# Patient Record
Sex: FEMALE | Race: WHITE | NOT HISPANIC OR LATINO | Employment: FULL TIME | URBAN - METROPOLITAN AREA
[De-identification: names, ages, dates, MRNs, and addresses within clinical notes are randomized per-mention and may not be internally consistent; named-entity substitution may affect disease eponyms.]

---

## 2017-04-13 ENCOUNTER — OFFICE VISIT (OUTPATIENT)
Dept: OBSTETRICS AND GYNECOLOGY | Facility: CLINIC | Age: 37
End: 2017-04-13

## 2017-04-13 VITALS
WEIGHT: 222.8 LBS | DIASTOLIC BLOOD PRESSURE: 94 MMHG | HEIGHT: 63 IN | HEART RATE: 86 BPM | BODY MASS INDEX: 39.48 KG/M2 | SYSTOLIC BLOOD PRESSURE: 138 MMHG

## 2017-04-13 DIAGNOSIS — N83.202 LEFT OVARIAN CYST: ICD-10-CM

## 2017-04-13 DIAGNOSIS — R74.8 ELEVATED LIVER ENZYMES: ICD-10-CM

## 2017-04-13 DIAGNOSIS — R10.32 LLQ ABDOMINAL PAIN: Primary | ICD-10-CM

## 2017-04-13 DIAGNOSIS — K92.1 MELENA: ICD-10-CM

## 2017-04-13 DIAGNOSIS — N92.1 MENOMETRORRHAGIA: ICD-10-CM

## 2017-04-13 DIAGNOSIS — F10.10 ALCOHOL ABUSE: ICD-10-CM

## 2017-04-13 DIAGNOSIS — N85.2 ENLARGED UTERUS: ICD-10-CM

## 2017-04-13 DIAGNOSIS — Z12.4 SCREENING FOR CERVICAL CANCER: ICD-10-CM

## 2017-04-13 DIAGNOSIS — Z72.0 TOBACCO USE: ICD-10-CM

## 2017-04-13 LAB
ALBUMIN SERPL-MCNC: 4.4 G/DL (ref 3.5–5.2)
ALBUMIN/GLOB SERPL: 1.8 G/DL
ALP SERPL-CCNC: 65 U/L (ref 39–117)
ALT SERPL-CCNC: 13 U/L (ref 1–33)
AST SERPL-CCNC: 13 U/L (ref 1–32)
BILIRUB SERPL-MCNC: 0.7 MG/DL (ref 0.1–1.2)
BUN SERPL-MCNC: 5 MG/DL (ref 6–20)
BUN/CREAT SERPL: 8.1 (ref 7–25)
CALCIUM SERPL-MCNC: 9.3 MG/DL (ref 8.6–10.5)
CHLORIDE SERPL-SCNC: 101 MMOL/L (ref 98–107)
CO2 SERPL-SCNC: 25 MMOL/L (ref 22–29)
CREAT SERPL-MCNC: 0.62 MG/DL (ref 0.57–1)
ERYTHROCYTE [DISTWIDTH] IN BLOOD BY AUTOMATED COUNT: 12.7 % (ref 11.7–13)
GLOBULIN SER CALC-MCNC: 2.4 GM/DL
GLUCOSE SERPL-MCNC: 98 MG/DL (ref 65–99)
HCT VFR BLD AUTO: 44.5 % (ref 35.6–45.5)
HGB BLD-MCNC: 14.9 G/DL (ref 11.9–15.5)
MCH RBC QN AUTO: 33.3 PG (ref 26.9–32)
MCHC RBC AUTO-ENTMCNC: 33.5 G/DL (ref 32.4–36.3)
MCV RBC AUTO: 99.6 FL (ref 80.5–98.2)
PLATELET # BLD AUTO: 363 10*3/MM3 (ref 140–500)
POTASSIUM SERPL-SCNC: 4 MMOL/L (ref 3.5–5.2)
PROT SERPL-MCNC: 6.8 G/DL (ref 6–8.5)
RBC # BLD AUTO: 4.47 10*6/MM3 (ref 3.9–5.2)
SODIUM SERPL-SCNC: 140 MMOL/L (ref 136–145)
TSH SERPL DL<=0.005 MIU/L-ACNC: 1.46 MIU/ML (ref 0.27–4.2)
WBC # BLD AUTO: 8.9 10*3/MM3 (ref 4.5–10.7)

## 2017-04-13 PROCEDURE — 99406 BEHAV CHNG SMOKING 3-10 MIN: CPT | Performed by: OBSTETRICS & GYNECOLOGY

## 2017-04-13 PROCEDURE — 99203 OFFICE O/P NEW LOW 30 MIN: CPT | Performed by: OBSTETRICS & GYNECOLOGY

## 2017-04-13 NOTE — PROGRESS NOTES
Ciara Chanel is a 36 y.o. female   CC: LLQ pain    History of Present Illness  Patient presents for the evaluation of left lower quadrant pain.  Pain is been ongoing for several months.  It is a colicky, crampy type pain in the left lower quadrant.  Patient went to the emergency room about 2 weeks ago because of the pain.  CT scan was performed which showed about a 4 x 4 cm left ovary which seemed enlarged from her previous CT scan about 15 months prior.  Otherwise a CT was unremarkable.  Only other significance were labs which showed mild elevation of the liver function tests.  Patient does report some irregular menses..  They last 1-2 days, or longer.  They happen irregularly.  Occasionally has breakthrough bleeding.  She does report pretty significant cramping with her periods, even if they'll only last 1-2 days.  Denies hot flashes or night sweats.  She is sexually active with 1 partner.  The patient reports that she had some type of procedure done after her last child was born 17 months ago for contraception.  She reports that she had a ring placed inside her abdomen which was supposed to be removable, but could be permanent if she wanted to be for contraception.  It sounds like that she had Falope-Rings placed.  The patient does also report frequent diarrhea.  She has loose bowel movements about every day.  She does report symptoms of bloating and early satiety.  She also notes dark, tarry stools at times.  She has never had this evaluated.      Past Medical History:   Diagnosis Date   • Abnormal Pap smear of cervix      Past Surgical History:   Procedure Laterality Date   •  SECTION     • CHOLECYSTECTOMY     • TONSILLECTOMY     • TUBAL ABDOMINAL LIGATION      Likely Fallope rings     Family History   Problem Relation Age of Onset   • Uterine cancer Mother    • No Known Problems Brother    • No Known Problems Daughter    • No Known Problems Brother    • No Known Problems Daughter   "    Social History   Substance Use Topics   • Smoking status: Current Every Day Smoker     Types: Cigarettes   • Smokeless tobacco: Never Used   • Alcohol use Yes     Meds:  None    No Known Allergies    Review of Systems  General: No fever or chills  Constitutional: No weight loss or gain, no hair loss  HENT: No headache, no hearing loss, no tinnitus  Eyes: normal vision, no eye pain  Lungs: No cough, no shortness of breath  Heart: No chest pain, no palpitations  Abdomen: No nausea, vomiting, constipation; Pos daily diarrhea  : No dysuria, no hematuria  Skin: No rashes  Lymph: No swelling  Neuro: No parathesia, no weakness  Psych: Normal though content, no hallucinations, no SI/HI    Objective   Physical Exam  Vitals:    04/13/17 1305   BP: 138/94   Pulse: 86   Weight: 222 lb 12.8 oz (101 kg)   Height: 63\" (160 cm)   Patient's last menstrual period was 04/04/2017.   Gen: No acute distress, awake and oriented times three  Abdomen: soft, nontender, non distended, normoactive bowel sounds  Pelvic:   Normal external female genitalia, no lesions  Vagina: No blood or discharge  Cervix: No cervical motion tenderness, no lesions, no active bleeding, nonfriable  Uterus: Anteverted, About 12 weeks size, irregular contour, mildly tender  Adnexa: No masses or tenderness  Rectal: Deferred  Psych: Good judgement and insight, normal affect and mood      Assessment/Plan    was seen today for abdominal pain.    Diagnoses and all orders for this visit:    LLQ abdominal pain    Left ovarian cyst    Screening for cervical cancer  -     IGP, Apt HPV,rfx 16 / 18,45    Melena    Tobacco use    Menometrorrhagia  -     CBC (No Diff)  -     TSH Rfx On Abnormal To Free T4    Enlarged uterus    Alcohol abuse  -     Comprehensive Metabolic Panel  -     Hepatitis Panel, Acute    Elevated liver enzymes  -     Comprehensive Metabolic Panel  -     Hepatitis Panel, Acute    Patient with a possible small ovarian cyst seen on CT scan about 2 " weeks ago.  I do not actually suspect this is the likely cause of her symptoms.  Her uterus feels mildly enlarged today, likely consistent with a fibroid uterus.  This may be causing some of her pain.  She also reports heavy and painful periods, but that only last 1-2 days. We will get an ultrasound of the pelvis in about 3-4 weeks for evaluation of the cyst to see if it has resolved.  Can also check for enlarged uterus.  I will see her back after that ultrasound.  Patient is due for a Pap smear, so we performed Pap smear today.  I have encouraged the patient to follow-up with her primary care physician for further evaluation of her chronic diarrhea, in GI symptoms.  Patient also had elevated liver function enzymes while in the emergency room.  She does admit to Dr. Duran alcohol every day.  This may be related to her alcohol use.  We will repeat her labs today as well as a hepatitis panel.  She denies any history of IV drug abuse.  Ultimately, she will need to follow up with this with her primary care physician.  She verbalized understanding.  I've independently reviewed the patient's outside emergency room records.  Return to the office in 3-4 weeks.  I spent 20 out of 30 minutes with the patient in face to face counseling of the above issues.  The patient is a smoker.  She's counseled about the risks of tobacco use and the importance of tobacco cessation.  Greater than 3 minutes is spent in tobacco cessation counseling.

## 2017-04-14 LAB
HAV IGM SERPL QL IA: NEGATIVE
HBV CORE IGM SERPL QL IA: NEGATIVE
HBV SURFACE AG SERPL QL IA: NEGATIVE
HCV AB S/CO SERPL IA: <0.1 S/CO RATIO (ref 0–0.9)

## 2017-04-19 LAB
CYTOLOGIST CVX/VAG CYTO: NORMAL
CYTOLOGY CVX/VAG DOC THIN PREP: NORMAL
DX ICD CODE: NORMAL
HIV 1 & 2 AB SER-IMP: NORMAL
HPV I/H RISK 4 DNA CVX QL PROBE+SIG AMP: NEGATIVE
OTHER STN SPEC: NORMAL
PATH REPORT.FINAL DX SPEC: NORMAL
STAT OF ADQ CVX/VAG CYTO-IMP: NORMAL

## 2017-04-20 ENCOUNTER — TELEPHONE (OUTPATIENT)
Dept: OBSTETRICS AND GYNECOLOGY | Facility: CLINIC | Age: 37
End: 2017-04-20

## 2017-04-20 NOTE — TELEPHONE ENCOUNTER
Notify the patient that her hepatitis tests were negative and her liver function tests were normal.  Her blood count is normal.  Her thyroid test was normal.

## 2017-04-20 NOTE — TELEPHONE ENCOUNTER
----- Message from Rayo Garcia MD sent at 4/19/2017  5:15 PM EDT -----  Notify the patient that her Pap smear was normal

## 2017-05-18 ENCOUNTER — OFFICE VISIT (OUTPATIENT)
Dept: OBSTETRICS AND GYNECOLOGY | Facility: CLINIC | Age: 37
End: 2017-05-18

## 2017-05-18 ENCOUNTER — PROCEDURE VISIT (OUTPATIENT)
Dept: OBSTETRICS AND GYNECOLOGY | Facility: CLINIC | Age: 37
End: 2017-05-18

## 2017-05-18 VITALS
WEIGHT: 224 LBS | DIASTOLIC BLOOD PRESSURE: 91 MMHG | HEIGHT: 63 IN | SYSTOLIC BLOOD PRESSURE: 139 MMHG | HEART RATE: 74 BPM | BODY MASS INDEX: 39.69 KG/M2

## 2017-05-18 DIAGNOSIS — N83.202 LEFT OVARIAN CYST: ICD-10-CM

## 2017-05-18 DIAGNOSIS — N92.1 MENOMETRORRHAGIA: ICD-10-CM

## 2017-05-18 DIAGNOSIS — N39.46 MIXED INCONTINENCE: ICD-10-CM

## 2017-05-18 DIAGNOSIS — D25.9 UTERINE LEIOMYOMA, UNSPECIFIED LOCATION: Primary | ICD-10-CM

## 2017-05-18 DIAGNOSIS — K92.1 MELENA: ICD-10-CM

## 2017-05-18 DIAGNOSIS — N83.202 LEFT OVARIAN CYST: Primary | ICD-10-CM

## 2017-05-18 DIAGNOSIS — D25.2 SUBSEROUS LEIOMYOMA OF UTERUS: ICD-10-CM

## 2017-05-18 PROBLEM — Z12.4 SCREENING FOR CERVICAL CANCER: Status: RESOLVED | Noted: 2017-04-13 | Resolved: 2017-05-18

## 2017-05-18 PROBLEM — R74.8 ELEVATED LIVER ENZYMES: Status: RESOLVED | Noted: 2017-04-13 | Resolved: 2017-05-18

## 2017-05-18 PROCEDURE — 76830 TRANSVAGINAL US NON-OB: CPT | Performed by: OBSTETRICS & GYNECOLOGY

## 2017-05-18 PROCEDURE — 99214 OFFICE O/P EST MOD 30 MIN: CPT | Performed by: OBSTETRICS & GYNECOLOGY

## 2017-05-18 RX ORDER — OXYBUTYNIN CHLORIDE 5 MG/1
5 TABLET ORAL 2 TIMES DAILY
Qty: 60 TABLET | Refills: 3 | Status: SHIPPED | OUTPATIENT
Start: 2017-05-18 | End: 2017-07-27

## 2017-07-27 ENCOUNTER — OFFICE VISIT (OUTPATIENT)
Dept: OBSTETRICS AND GYNECOLOGY | Facility: CLINIC | Age: 37
End: 2017-07-27

## 2017-07-27 VITALS
HEIGHT: 63 IN | WEIGHT: 230 LBS | DIASTOLIC BLOOD PRESSURE: 82 MMHG | HEART RATE: 83 BPM | SYSTOLIC BLOOD PRESSURE: 124 MMHG | BODY MASS INDEX: 40.75 KG/M2

## 2017-07-27 DIAGNOSIS — R10.30 LOWER ABDOMINAL PAIN: Primary | ICD-10-CM

## 2017-07-27 DIAGNOSIS — N91.5 OLIGOMENORRHEA: ICD-10-CM

## 2017-07-27 DIAGNOSIS — N39.46 MIXED INCONTINENCE: ICD-10-CM

## 2017-07-27 DIAGNOSIS — N83.202 LEFT OVARIAN CYST: ICD-10-CM

## 2017-07-27 PROBLEM — N92.1 MENOMETRORRHAGIA: Status: RESOLVED | Noted: 2017-04-13 | Resolved: 2017-07-27

## 2017-07-27 PROBLEM — R10.32 LLQ ABDOMINAL PAIN: Status: RESOLVED | Noted: 2017-04-13 | Resolved: 2017-07-27

## 2017-07-27 LAB
ALBUMIN SERPL-MCNC: 4 G/DL (ref 3.5–5.2)
ALBUMIN/GLOB SERPL: 1.5 G/DL
ALP SERPL-CCNC: 61 U/L (ref 39–117)
ALT SERPL-CCNC: 15 U/L (ref 1–33)
AST SERPL-CCNC: 17 U/L (ref 1–32)
BILIRUB BLD-MCNC: NEGATIVE MG/DL
BILIRUB SERPL-MCNC: 0.5 MG/DL (ref 0.1–1.2)
BUN SERPL-MCNC: 8 MG/DL (ref 6–20)
BUN/CREAT SERPL: 13.8 (ref 7–25)
CALCIUM SERPL-MCNC: 9.3 MG/DL (ref 8.6–10.5)
CHLORIDE SERPL-SCNC: 102 MMOL/L (ref 98–107)
CLARITY, POC: CLEAR
CO2 SERPL-SCNC: 22 MMOL/L (ref 22–29)
COLOR UR: YELLOW
CREAT SERPL-MCNC: 0.58 MG/DL (ref 0.57–1)
GLOBULIN SER CALC-MCNC: 2.7 GM/DL
GLUCOSE SERPL-MCNC: 94 MG/DL (ref 65–99)
GLUCOSE UR STRIP-MCNC: NEGATIVE MG/DL
HBA1C MFR BLD: 4.7 % (ref 4.8–5.6)
KETONES UR QL: NEGATIVE
LEUKOCYTE EST, POC: NEGATIVE
NITRITE UR-MCNC: NEGATIVE MG/ML
PH UR: 5.5 [PH] (ref 5–8)
POTASSIUM SERPL-SCNC: 4.3 MMOL/L (ref 3.5–5.2)
PROT SERPL-MCNC: 6.7 G/DL (ref 6–8.5)
PROT UR STRIP-MCNC: NEGATIVE MG/DL
RBC # UR STRIP: ABNORMAL /UL
SODIUM SERPL-SCNC: 138 MMOL/L (ref 136–145)
SP GR UR: 1.03 (ref 1–1.03)
UROBILINOGEN UR QL: NORMAL

## 2017-07-27 PROCEDURE — 99214 OFFICE O/P EST MOD 30 MIN: CPT | Performed by: OBSTETRICS & GYNECOLOGY

## 2017-07-27 RX ORDER — TOLTERODINE TARTRATE 2 MG/1
2 TABLET, EXTENDED RELEASE ORAL 2 TIMES DAILY
Qty: 60 TABLET | Refills: 0 | Status: SHIPPED | OUTPATIENT
Start: 2017-07-27 | End: 2017-08-22 | Stop reason: SDUPTHER

## 2017-07-27 NOTE — PROGRESS NOTES
Subjective   Melissa Chanel is a 36 y.o. female   CC: Lower abdominal pain, leakage of urine, irregular menses  History of Present Illness  Patient is here for the evaluation of several issues today.  Her biggest concern is with her lower abdominal pain.  She reports that this is still ongoing for the time of her last visit.  She reports that she feels the pains about 2-3 times per week.  They're located in the central lower abdomen.  Occasionally they will awaken her from sleep.  She reports they're severe when she has them.  Patient is also having persistent problems with leakage of urine.  She has mostly what seems to be urge symptoms.  She has leakage of urine every day.  She has started to her incontinence pads.  She reports that she gets embarrassed over her symptoms.  She tried oxybutynin for about 3 months with no significant relief of her symptoms, but with side effects of dry mouth.  She has tried changing her fluid intake.  She has limited soft drinks and caffeine.  She has not been able to lose any weight since her last visit, in fact she has gained about 6 pounds.  The patient is also not had a period since her last visit.  The patient reports that she is very concerned about the causes of her pain.  She feels that while the pain and the leakage of urine or both stressful, the pain is more concerning to her.  She would like to avoid surgery if possible.      The following portions of the patient's history were reviewed and updated as appropriate: allergies, current medications, past family history, past medical history, past social history, past surgical history and problem list.    Review of Systems  General: No fever or chills  Constitutional: No weight loss or gain, no hair loss  HENT: No headache, no hearing loss, no tinnitus  Abdomen: No nausea, vomiting, constipation or diarrhea  : No dysuria, no hematuria  Skin: No rashes  Lymph: No swelling  Neuro: No parathesia, no weakness  Psych: Normal  "though content, no hallucinations, no SI/HI    Objective   Physical Exam  Vitals:    07/27/17 0951   BP: 124/82   Pulse: 83   Weight: 230 lb (104 kg)   Height: 63\" (160 cm)   Gen: No acute distress, awake and oriented times three  Abdomen: soft, nontender, non distended, normoactive bowel sounds  Pelvic:   Normal external female genitalia, no lesions  Vagina: No blood or discharge  Cervix: No cervical motion tenderness, no lesions, no active bleeding, nonfriable  Uterus: Anteverted, about 12 weeks size, mildly tender  Adnexa: No masses or tenderness  Rectal: Deferred  Psych: Good judgement and insight, normal affect and mood      Assessment/Plan   Diagnoses and all orders for this visit:    Lower abdominal pain  -     Comprehensive Metabolic Panel  -     diclofenac (VOLTAREN) 50 MG EC tablet; Take 1 tablet by mouth 4 (Four) Times a Day As Needed (pain). Take four times daily for 5 consecutive days during your period  -     POC Urinalysis Dipstick    Oligomenorrhea  -     Testosterone Free Direct  -     Testosterone  -     Follicle Stimulating Hormone  -     Estradiol  -     Prolactin  -     Hemoglobin A1c  -     Comprehensive Metabolic Panel    Left ovarian cyst    Mixed incontinence  -     Urine Culture  -     tolterodine (DETROL) 2 MG tablet; Take 1 tablet by mouth 2 (Two) Times a Day.  -     Ambulatory Referral to Gynecologic Urology    Unclear etiology of her abdominal pain at this time.  Patient does have an enlarged uterus which is somewhat tender on exam.  This may be secondary to uterine fibroid or adenomyosis.  I would suspect, however, that the patient had adenomyosis she would be having more bleeding issues.  We'll obtain an ultrasound to reevaluate for the previously seen small simple ovarian cyst as well as the size and shape of the uterus.  Start diclofenac to help with her abdominal pain.  We will obtain lab work to look into the patient's  Set oligomenorrhea/amenorrhea.  Regarding the patient's " urinary incontinence, it sounds more consistent with either an urge or mixed incontinence.  The patient got no relief with oxybutynin.  We will try Detrol.  I will also refer the patient to urogynecology for further evaluation.  I have some concerns of the patient's pains may be secondary to uterine fibroid.  We have we discussed today the possibility of a hysterectomy.  Additionally, hysterectomy were necessary, she may consider concomitant surgery for leakage of urine, if indicated.  This may be a combined case between our office and urogynecology.  Return to the office in 2-4 weeks.  Repeat pelvic ultrasound at that time.  GYN visit follow.  She should also try to make an appointment with urogynecology.  I've encouraged patient to work on weight loss.  Continue dietary changes and increase exercise.

## 2017-07-28 LAB
ESTRADIOL SERPL-MCNC: 90.4 PG/ML
FSH SERPL-ACNC: 8 MIU/ML
PROLACTIN SERPL-MCNC: 22.3 NG/ML (ref 4.8–23.3)
TESTOST FREE SERPL-MCNC: 3.8 PG/ML (ref 0–4.2)
TESTOST SERPL-MCNC: 41 NG/DL (ref 8–48)

## 2017-07-29 LAB
BACTERIA UR CULT: NORMAL
BACTERIA UR CULT: NORMAL

## 2017-08-22 ENCOUNTER — OFFICE VISIT (OUTPATIENT)
Dept: OBSTETRICS AND GYNECOLOGY | Facility: CLINIC | Age: 37
End: 2017-08-22

## 2017-08-22 ENCOUNTER — PROCEDURE VISIT (OUTPATIENT)
Dept: OBSTETRICS AND GYNECOLOGY | Facility: CLINIC | Age: 37
End: 2017-08-22

## 2017-08-22 VITALS
DIASTOLIC BLOOD PRESSURE: 80 MMHG | SYSTOLIC BLOOD PRESSURE: 120 MMHG | HEIGHT: 63 IN | HEART RATE: 73 BPM | BODY MASS INDEX: 41.11 KG/M2 | WEIGHT: 232 LBS

## 2017-08-22 DIAGNOSIS — N83.201 RIGHT OVARIAN CYST: ICD-10-CM

## 2017-08-22 DIAGNOSIS — R10.30 LOWER ABDOMINAL PAIN: Primary | ICD-10-CM

## 2017-08-22 DIAGNOSIS — N83.201 CYST OF RIGHT OVARY: ICD-10-CM

## 2017-08-22 DIAGNOSIS — N94.6 DYSMENORRHEA: ICD-10-CM

## 2017-08-22 DIAGNOSIS — R10.2 PELVIC PAIN: Primary | ICD-10-CM

## 2017-08-22 DIAGNOSIS — N39.46 MIXED INCONTINENCE: ICD-10-CM

## 2017-08-22 PROBLEM — N83.202 LEFT OVARIAN CYST: Status: RESOLVED | Noted: 2017-04-13 | Resolved: 2017-08-22

## 2017-08-22 PROBLEM — N85.2 ENLARGED UTERUS: Status: RESOLVED | Noted: 2017-04-13 | Resolved: 2017-08-22

## 2017-08-22 PROCEDURE — 76830 TRANSVAGINAL US NON-OB: CPT | Performed by: OBSTETRICS & GYNECOLOGY

## 2017-08-22 PROCEDURE — 99214 OFFICE O/P EST MOD 30 MIN: CPT | Performed by: OBSTETRICS & GYNECOLOGY

## 2017-08-22 RX ORDER — DICYCLOMINE HCL 20 MG
20 TABLET ORAL 4 TIMES DAILY PRN
Qty: 60 TABLET | Refills: 1 | Status: SHIPPED | OUTPATIENT
Start: 2017-08-22 | End: 2019-02-14

## 2017-08-22 RX ORDER — TOLTERODINE TARTRATE 2 MG/1
2 TABLET, EXTENDED RELEASE ORAL 2 TIMES DAILY
Qty: 60 TABLET | Refills: 0 | Status: SHIPPED | OUTPATIENT
Start: 2017-08-22 | End: 2019-02-14

## 2017-08-22 NOTE — PROGRESS NOTES
"Subjective   Melissa Chanel is a 36 y.o. female   CC: Pt here for fu US.  History of Present Illness  Pt here for fu US.  Ultrasound was performed to evaluate for abdominal pain.  Patient still reports central eyes lower abdominal pain.  This is present regardless of her menses.  She notices about 3-4 times per week.  She also does notices abdominal bloating.  She does also have problems with very painful periods.  Her bleeding last 1-2 days but is very painful.  She is on her period today.  Patient is also still having problems with leakage of urine.  She has been seen urogynecology, Dr. Holder, and they're planning to urodynamics in the near future.  She reports a little relief with the Detrol.  Patient is trying to cut down on smoking, which she is still smoking.  She is also making dietary and lifestyle changes to help with losing weight.      The following portions of the patient's history were reviewed and updated as appropriate: allergies, current medications, past family history, past medical history, past social history, past surgical history and problem list.    Review of Systems  General: No fever or chills  : No dysuria, no hematuria  Psych: Normal though content, no hallucinations, no SI/HI    Objective   Physical Exam  Vitals:    08/22/17 1356   BP: 120/80   Pulse: 73   Weight: 232 lb (105 kg)   Height: 63\" (160 cm)   Gen: No acute distress, awake and oriented times three  Pelvic: Deferred  Psych: Good judgement and insight, normal affect and mood    Ultrasound today: Uterus is 8 cm in maximum diameter.  There is a 1.3 cm fundal fibroid the stable appearance from the last ultrasound.  Left ovary is normal-appearing with interval resolution of the previously seen cyst.  Right ovary is now enlarged with a 5 cm simple appearing cyst with a septation.  There is no pelvic free fluid.      Assessment/Plan   Diagnoses and all orders for this visit:    Lower abdominal pain  -     diclofenac (VOLTAREN) 50 " MG EC tablet; Take 1 tablet by mouth 4 (Four) Times a Day As Needed (pain). Take four times daily for 5 consecutive days during your period  -     dicyclomine (BENTYL) 20 MG tablet; Take 1 tablet by mouth 4 (Four) Times a Day As Needed (abdominal pain).    Dysmenorrhea    Mixed incontinence  -     tolterodine (DETROL) 2 MG tablet; Take 1 tablet by mouth 2 (Two) Times a Day.    Right ovarian cyst      The patient has had an oval resolution of her left ovarian cyst with interval development of a new cyst on the right ovary.  Overall, I doubt that this is the cause of her pain.  Her pain seems to persist throughout the month regardless of her menstrual cycle.  Additionally, the previously seen left ovarian cyst has resolved, and this is a new cyst on the right side.  Most of her pain is in the midline.  She does also note some bloating.  I more concerned patient may have other causes of her pain such as musculoskeletal strain, gastroenterologic source, etc.  Patient does have painful menses, but this only lasts for 1-2 days.  She has not really been taking diclofenac much.  I've encouraged the patient to try to take diclofenac for the days of her menstrual cycle.  We'll also start the patient on Bentyl for one she is not on her period to see if this helps with her abdominal discomfort.  I've encouraged the patient to see her primary care physician for possible evaluation with gastroenterology to look for other sources of her abdominal pain.  The patient seems to be more interested in having a hysterectomy.  My concern is that her pain may be from a non-gynecologic source, in which case a hysterectomy we unlikely to help her pain, and would take on significant risks.  The patient is obese and is a smoker; therefore, she is at somewhat increased perioperative risk of complications.  I have encouraged the patient to start the Bentyl when she is not on her period, take the diclofenac during her period, and see her primary  care physician.  I will plan to see her back in 6 weeks for repeat ultrasound to evaluate for persistence or resolution of the newly found right ovarian cyst.  She should also continue to follow up with urogynecology.  She may continue her Detrol until we have more information there.  If patient has exhausted her other options and is still having pain, we may consider hysterectomy, but I have counseled the patient that I feel need to explore other avenues of the source of her pain prior to proceeding with surgery.  She verbalizes understanding.    I spent 20 out of 25 minutes with the patient in face to face counseling of the above issues.

## 2018-07-22 ENCOUNTER — HOSPITAL ENCOUNTER (EMERGENCY)
Facility: HOSPITAL | Age: 38
Discharge: HOME | End: 2018-07-22
Attending: EMERGENCY MEDICINE | Admitting: EMERGENCY MEDICINE

## 2018-07-22 VITALS
SYSTOLIC BLOOD PRESSURE: 130 MMHG | WEIGHT: 236 LBS | HEIGHT: 63 IN | DIASTOLIC BLOOD PRESSURE: 84 MMHG | HEART RATE: 90 BPM | OXYGEN SATURATION: 100 % | BODY MASS INDEX: 41.82 KG/M2 | TEMPERATURE: 98.4 F | RESPIRATION RATE: 15 BRPM

## 2018-07-22 DIAGNOSIS — T14.8XXA SUPERFICIAL LACERATION: ICD-10-CM

## 2018-07-22 DIAGNOSIS — F10.920 ALCOHOLIC INTOXICATION WITHOUT COMPLICATION (HCC): Primary | ICD-10-CM

## 2018-07-22 LAB
AMPHET+METHAMPHET UR QL: NEGATIVE
BARBITURATES UR QL SCN: NEGATIVE
BENZODIAZ UR QL SCN: NEGATIVE
CANNABINOIDS SERPL QL: POSITIVE
COCAINE UR QL: NEGATIVE
ETHANOL BLD-MCNC: 138 MG/DL (ref 0–10)
ETHANOL UR QL: 0.14 %
METHADONE UR QL SCN: NEGATIVE
OPIATES UR QL: NEGATIVE
OXYCODONE UR QL SCN: NEGATIVE

## 2018-07-22 PROCEDURE — 80307 DRUG TEST PRSMV CHEM ANLYZR: CPT | Performed by: PHYSICIAN ASSISTANT

## 2018-07-22 PROCEDURE — 99285 EMERGENCY DEPT VISIT HI MDM: CPT

## 2018-07-22 PROCEDURE — 90791 PSYCH DIAGNOSTIC EVALUATION: CPT

## 2018-07-22 RX ORDER — IBUPROFEN 800 MG/1
800 TABLET ORAL ONCE
Status: COMPLETED | OUTPATIENT
Start: 2018-07-22 | End: 2018-07-22

## 2018-07-22 RX ADMIN — IBUPROFEN 800 MG: 800 TABLET ORAL at 06:00

## 2019-02-14 ENCOUNTER — OFFICE VISIT (OUTPATIENT)
Dept: OBSTETRICS AND GYNECOLOGY | Facility: CLINIC | Age: 39
End: 2019-02-14

## 2019-02-14 VITALS
HEIGHT: 63 IN | BODY MASS INDEX: 43.94 KG/M2 | HEART RATE: 80 BPM | DIASTOLIC BLOOD PRESSURE: 90 MMHG | SYSTOLIC BLOOD PRESSURE: 132 MMHG | WEIGHT: 248 LBS

## 2019-02-14 DIAGNOSIS — N94.6 DYSMENORRHEA: ICD-10-CM

## 2019-02-14 DIAGNOSIS — N92.0 MENORRHAGIA WITH REGULAR CYCLE: Primary | ICD-10-CM

## 2019-02-14 DIAGNOSIS — K29.50 CHRONIC GASTRITIS WITHOUT BLEEDING, UNSPECIFIED GASTRITIS TYPE: ICD-10-CM

## 2019-02-14 DIAGNOSIS — N39.46 MIXED INCONTINENCE: ICD-10-CM

## 2019-02-14 DIAGNOSIS — Z71.6 ENCOUNTER FOR TOBACCO USE CESSATION COUNSELING: ICD-10-CM

## 2019-02-14 DIAGNOSIS — L03.311 ABDOMINAL WALL CELLULITIS: ICD-10-CM

## 2019-02-14 DIAGNOSIS — R10.30 LOWER ABDOMINAL PAIN: ICD-10-CM

## 2019-02-14 PROBLEM — N91.5 OLIGOMENORRHEA: Status: RESOLVED | Noted: 2017-07-27 | Resolved: 2019-02-14

## 2019-02-14 PROBLEM — E66.01 MORBID OBESITY (HCC): Status: ACTIVE | Noted: 2019-02-14

## 2019-02-14 PROCEDURE — 99214 OFFICE O/P EST MOD 30 MIN: CPT | Performed by: OBSTETRICS & GYNECOLOGY

## 2019-02-14 PROCEDURE — 99406 BEHAV CHNG SMOKING 3-10 MIN: CPT | Performed by: OBSTETRICS & GYNECOLOGY

## 2019-02-14 RX ORDER — PANTOPRAZOLE SODIUM 40 MG/1
40 TABLET, DELAYED RELEASE ORAL DAILY
Qty: 30 TABLET | Refills: 5 | Status: SHIPPED | OUTPATIENT
Start: 2019-02-14 | End: 2019-08-19 | Stop reason: SDUPTHER

## 2019-02-14 RX ORDER — CEPHALEXIN 500 MG/1
500 CAPSULE ORAL 2 TIMES DAILY
Qty: 20 CAPSULE | Refills: 0 | Status: SHIPPED | OUTPATIENT
Start: 2019-02-14 | End: 2019-02-24

## 2019-02-14 RX ORDER — IBUPROFEN 400 MG/1
400 TABLET ORAL EVERY 6 HOURS PRN
COMMUNITY
End: 2019-12-18 | Stop reason: HOSPADM

## 2019-02-14 NOTE — PROGRESS NOTES
Subjective   Melissa Chanel is a 38 y.o. female.   CC: pt here for painful cycles and lots of clotting.   History of Present Illness   Patient here with complaints of abdominal pain and heavy and painful periods.  She says that she has pain basically every day of the month.  She says that her pain intensifies around the time of her periods.  Patient reports that she wears a heating pad on her abdomen so frequently that she actually gets burns and lesions on her abdomen.  Patient also reports that she takes about 6-8 pills worth of ibuprofen every day.  She says that her periods are unpredictable, but when she has them they last about 1-2 days, but they are very heavy and painful during that time.  Patient has previously seen me, but not for about the last 18 months.  She has had a history of ovarian cysts in the past which have come and gone.  Last ultrasound was in August 2017.  She was also referred to urogynecology at that time, and she had at least one appointment with them, but she was lost to follow-up.  Patient also reports significant GI symptoms.  She reports frequent episodes of diarrhea that has been long-standing.  She reports abdominal bloating and discomfort.  She reports almost daily episodes of fecal urgency and occasional episodes of fecal incontinence.  Patient has had previous EGD performed by GI that showed duodenal ulcer, gastritis, esophagitis.  She also had abnormal mitochondrial IgG AB-AMA in January 2016.  Liver function tests were elevated at that time.  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  She has had cholecystectomy in the past.      Current Outpatient Medications:   •  ibuprofen (ADVIL,MOTRIN) 400 MG tablet, Take 400 mg by mouth Every 6 (Six) Hours As Needed., Disp: , Rfl:   •  pantoprazole (PROTONIX) 40 MG EC tablet, Take 1 tablet by mouth Daily., Disp: 30 tablet, Rfl: 5     No Known Allergies     The following portions of the patient's history were reviewed  "and updated as appropriate: allergies, current medications, past family history, past medical history, past social history, past surgical history and problem list.    Review of Systems  General: No fever or chills  Constitutional: Pos weight gain, no hair loss  HENT: No headache, no hearing loss, no tinnitus  Eyes: normal vision, no eye pain  Lungs: No cough, no shortness of breath  Heart: No chest pain, no palpitations  Abdomen: Pos nausea, Pos diarrhea  : No dysuria, no hematuria  Skin: No rashes  Lymph: No swelling  Neuro: No parathesia, no weakness  Psych: Normal though content, no hallucinations, no SI/HI    Objective   Physical Exam  Vitals:    02/14/19 1349   BP: 132/90   Pulse: 80   Weight: 112 kg (248 lb)   Height: 160 cm (63\")     Patient's last menstrual period was 01/31/2019 (approximate).     Gen: No acute distress, awake and oriented times three  Abdomen: soft, nontender, no masses or hernia, no hepatosplenomegaly, non distended, normoactive bowel sounds  There are 2 roughly quarter-sized erythematous, weepy lesions on her abdominal wall.  The patient states that these are burns from her heating pad.  Pelvic: Exam performed in the presence of a female chaperone  Patient has provided verbal consent to proceed with exam.  Normal external female genitalia, no lesions  Urethra: Normal meatus, no caruncle  Bladder: nontender  Vagina: No blood or discharge  Cervix: No cervical motion tenderness, no lesions, no active bleeding, nonfriable  Uterus: Anteverted, about 10 weeks size, mildly tender  Adnexa: No masses or tenderness  External anal exam: Normal appearance, no lesions or hemorrhoids  Rectal: Deferred  Psych: Good judgement and insight, normal affect and mood      Assessment/Plan   Diagnoses and all orders for this visit:    Menorrhagia with regular cycle  -     CBC (No Diff)  -     Comprehensive Metabolic Panel  -     TSH Rfx On Abnormal To Free T4  We will check labs as above.  We will also obtain " pelvic ultrasound to evaluate for problems such as fibroids or polyps that may be causing her periods to be heavy.  Lower abdominal pain  -     NuSwab VG+ - Swab, Vagina  I am not convinced that the only source of her abdominal pain is gynecologic in nature.  Patient reports that she has pain basically every day, although it does intensify during her menses.  She says her periods typically last 1-2 days/month.  Patient does endorse other symptoms suggestive of more of a GI source including frequent diarrhea, fecal urgency, fecal incontinence, abdominal bloating.  Even if there were issues related to her uterus or ovaries that could be causing abdominal pain, I would be concerned that if she did not evaluate this further with her primary care physician or gastroenterologist she would continue to have significant pain even after definitive treatment from a gynecologic standpoint.  Patient needs concomitant follow-up with PCP/gastroenterology.  We will obtain cultures today and also to help exclude infectious source of pain and heavy bleeding.  Patient also has some cellulitic appearing wounds on her abdomen, likely from which she reports as burns from a heating pad.  I have cautioned the patient about the safe use of heating pads and that she needs to decrease the temperature in the time to which she is using the pad.  I also recommended that she put cough material between the pad in her skin.  We discussed appropriate wound care.  I will treat her empirically with a course of keflex.  Dysmenorrhea  -     NuSwab VG+ - Swab, Vagina    Mixed incontinence  This has been a long-standing issue for the patient.  She was previously treated with Detrol and had seen urogynecology, but the patient was lost to follow-up.  Encounter for tobacco use cessation counseling  I advised  of the risks of continuing to use tobacco, and I provided her with tobacco cessation educational materials in the After Visit Summary.  I  explained to the patient the short-term and long-term risk of tobacco use.  I also explained that if surgical therapy were ever going to be an option for her to help with her symptoms, she would need to stop smoking, smoking would greatly increase the risk of perioperative complications.    During this visit, I spent greater than 3 minutes counseling the patient regarding tobacco cessation.  Chronic gastritis without bleeding, unspecified gastritis type  -     pantoprazole (PROTONIX) 40 MG EC tablet; Take 1 tablet by mouth Daily.    Patient has had previous procedures which have indicated duodenal ulcers, gastritis, esophagitis.  She is on basically no therapy for this.  I will start the patient on a daily PPI, but she needs to follow-up with gastroenterology.  This may be a source of her abdominal pain as well.  Patient needs to limit her NSAID use.

## 2019-02-15 LAB
ALBUMIN SERPL-MCNC: 4 G/DL (ref 3.5–5.5)
ALBUMIN/GLOB SERPL: 1.7 {RATIO} (ref 1.2–2.2)
ALP SERPL-CCNC: 67 IU/L (ref 39–117)
ALT SERPL-CCNC: 8 IU/L (ref 0–32)
AST SERPL-CCNC: 12 IU/L (ref 0–40)
BILIRUB SERPL-MCNC: 0.3 MG/DL (ref 0–1.2)
BUN SERPL-MCNC: 8 MG/DL (ref 6–20)
BUN/CREAT SERPL: 14 (ref 9–23)
CALCIUM SERPL-MCNC: 9 MG/DL (ref 8.7–10.2)
CHLORIDE SERPL-SCNC: 106 MMOL/L (ref 96–106)
CO2 SERPL-SCNC: 21 MMOL/L (ref 20–29)
CREAT SERPL-MCNC: 0.57 MG/DL (ref 0.57–1)
ERYTHROCYTE [DISTWIDTH] IN BLOOD BY AUTOMATED COUNT: 13.8 % (ref 12.3–15.4)
GLOBULIN SER CALC-MCNC: 2.4 G/DL (ref 1.5–4.5)
GLUCOSE SERPL-MCNC: 85 MG/DL (ref 65–99)
HCT VFR BLD AUTO: 41.5 % (ref 34–46.6)
HGB BLD-MCNC: 13.3 G/DL (ref 11.1–15.9)
MCH RBC QN AUTO: 32.4 PG (ref 26.6–33)
MCHC RBC AUTO-ENTMCNC: 32 G/DL (ref 31.5–35.7)
MCV RBC AUTO: 101 FL (ref 79–97)
PLATELET # BLD AUTO: 328 X10E3/UL (ref 150–379)
POTASSIUM SERPL-SCNC: 4.9 MMOL/L (ref 3.5–5.2)
PROT SERPL-MCNC: 6.4 G/DL (ref 6–8.5)
RBC # BLD AUTO: 4.11 X10E6/UL (ref 3.77–5.28)
SODIUM SERPL-SCNC: 142 MMOL/L (ref 134–144)
TSH SERPL DL<=0.005 MIU/L-ACNC: 1.8 UIU/ML (ref 0.45–4.5)
WBC # BLD AUTO: 8.4 X10E3/UL (ref 3.4–10.8)

## 2019-02-17 DIAGNOSIS — N76.0 BACTERIAL VAGINOSIS: Primary | ICD-10-CM

## 2019-02-17 DIAGNOSIS — B96.89 BACTERIAL VAGINOSIS: Primary | ICD-10-CM

## 2019-02-17 LAB
A VAGINAE DNA VAG QL NAA+PROBE: ABNORMAL SCORE
BVAB2 DNA VAG QL NAA+PROBE: ABNORMAL SCORE
C ALBICANS DNA VAG QL NAA+PROBE: NEGATIVE
C GLABRATA DNA VAG QL NAA+PROBE: NEGATIVE
C TRACH RRNA SPEC QL NAA+PROBE: NEGATIVE
MEGA1 DNA VAG QL NAA+PROBE: ABNORMAL SCORE
N GONORRHOEA RRNA SPEC QL NAA+PROBE: NEGATIVE
T VAGINALIS RRNA SPEC QL NAA+PROBE: NEGATIVE

## 2019-02-17 RX ORDER — METRONIDAZOLE 500 MG/1
500 TABLET ORAL 2 TIMES DAILY
Qty: 14 TABLET | Refills: 0 | Status: SHIPPED | OUTPATIENT
Start: 2019-02-17 | End: 2019-02-24

## 2019-02-18 ENCOUNTER — TELEPHONE (OUTPATIENT)
Dept: OBSTETRICS AND GYNECOLOGY | Facility: CLINIC | Age: 39
End: 2019-02-18

## 2019-02-18 NOTE — TELEPHONE ENCOUNTER
Voice mail not set-up and other number [phone] is [employer].  Pt has two results notes please let pt know of both. KAY        ----- Message from Rayo Garcia MD sent at 2/15/2019 12:18 PM EST -----  Blood work from yesterday was normal.  She is not anemic.  Thyroid test was normal.  Culture still pending.

## 2019-02-20 ENCOUNTER — TELEPHONE (OUTPATIENT)
Dept: OBSTETRICS AND GYNECOLOGY | Facility: CLINIC | Age: 39
End: 2019-02-20

## 2019-02-20 NOTE — TELEPHONE ENCOUNTER
----- Message from Rayo Garcia MD sent at 2/15/2019 12:18 PM EST -----  Blood work from yesterday was normal.  She is not anemic.  Thyroid test was normal.  Culture still pending.

## 2019-02-20 NOTE — TELEPHONE ENCOUNTER
Voice mail not set-up yet. Could not leave a message. Other phone number [phone] is [employer]. KAY

## 2019-02-28 ENCOUNTER — TELEPHONE (OUTPATIENT)
Dept: OBSTETRICS AND GYNECOLOGY | Facility: CLINIC | Age: 39
End: 2019-02-28

## 2019-02-28 NOTE — TELEPHONE ENCOUNTER
Letter sent on 2/28/2019. KAY        ----- Message from Rayo Garcia MD sent at 2/15/2019 12:18 PM EST -----  Blood work from yesterday was normal.  She is not anemic.  Thyroid test was normal.  Culture still pending.

## 2019-03-13 ENCOUNTER — PROCEDURE VISIT (OUTPATIENT)
Dept: OBSTETRICS AND GYNECOLOGY | Facility: CLINIC | Age: 39
End: 2019-03-13

## 2019-03-13 ENCOUNTER — OFFICE VISIT (OUTPATIENT)
Dept: OBSTETRICS AND GYNECOLOGY | Facility: CLINIC | Age: 39
End: 2019-03-13

## 2019-03-13 VITALS
WEIGHT: 244 LBS | HEART RATE: 84 BPM | SYSTOLIC BLOOD PRESSURE: 138 MMHG | BODY MASS INDEX: 43.23 KG/M2 | HEIGHT: 63 IN | DIASTOLIC BLOOD PRESSURE: 100 MMHG

## 2019-03-13 DIAGNOSIS — N92.1 MENORRHAGIA WITH IRREGULAR CYCLE: Primary | ICD-10-CM

## 2019-03-13 DIAGNOSIS — R10.30 LOWER ABDOMINAL PAIN: ICD-10-CM

## 2019-03-13 DIAGNOSIS — L03.311 ABDOMINAL WALL CELLULITIS: ICD-10-CM

## 2019-03-13 PROCEDURE — 76830 TRANSVAGINAL US NON-OB: CPT | Performed by: OBSTETRICS & GYNECOLOGY

## 2019-03-13 PROCEDURE — 99213 OFFICE O/P EST LOW 20 MIN: CPT | Performed by: OBSTETRICS & GYNECOLOGY
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
## 2019-08-19 ENCOUNTER — TELEPHONE (OUTPATIENT)
Dept: OBSTETRICS AND GYNECOLOGY | Facility: CLINIC | Age: 39
End: 2019-08-19

## 2019-08-19 DIAGNOSIS — K29.50 CHRONIC GASTRITIS WITHOUT BLEEDING, UNSPECIFIED GASTRITIS TYPE: ICD-10-CM

## 2019-08-19 RX ORDER — PANTOPRAZOLE SODIUM 40 MG/1
40 TABLET, DELAYED RELEASE ORAL DAILY
Qty: 30 TABLET | Refills: 1 | Status: SHIPPED | OUTPATIENT
Start: 2019-08-19 | End: 2019-10-23 | Stop reason: SDUPTHER

## 2019-08-19 NOTE — TELEPHONE ENCOUNTER
I will send refill, but please encourage patient to schedule follow up with GI as I do not see this has been done since that office visit. I will send 2 month supply. Thanks!

## 2019-08-19 NOTE — TELEPHONE ENCOUNTER
Jose pt calling she would like to know if she can get a refill on RX pantoprazole (PROTONIX) 40 MG EC tablet. Please see jose office note 02/14/2019.         Thank you

## 2019-10-13 DIAGNOSIS — K29.50 CHRONIC GASTRITIS WITHOUT BLEEDING, UNSPECIFIED GASTRITIS TYPE: ICD-10-CM

## 2019-10-14 RX ORDER — PANTOPRAZOLE SODIUM 40 MG/1
TABLET, DELAYED RELEASE ORAL
Qty: 30 TABLET | Refills: 1 | OUTPATIENT
Start: 2019-10-14

## 2019-10-23 ENCOUNTER — TELEPHONE (OUTPATIENT)
Dept: OBSTETRICS AND GYNECOLOGY | Facility: CLINIC | Age: 39
End: 2019-10-23

## 2019-10-23 DIAGNOSIS — K92.1 HEMATOCHEZIA: Primary | ICD-10-CM

## 2019-10-23 DIAGNOSIS — K29.50 CHRONIC GASTRITIS WITHOUT BLEEDING, UNSPECIFIED GASTRITIS TYPE: ICD-10-CM

## 2019-10-23 RX ORDER — PANTOPRAZOLE SODIUM 40 MG/1
40 TABLET, DELAYED RELEASE ORAL DAILY
Qty: 30 TABLET | Refills: 1 | Status: ON HOLD | OUTPATIENT
Start: 2019-10-23 | End: 2019-12-18 | Stop reason: SDUPTHER

## 2019-10-23 NOTE — TELEPHONE ENCOUNTER
Pt called to request 1 month refill on pantoprazole (PROTONIX) 40 MG EC tablet.  She would also like a referral to gastroenterology for abdominal pain and blood in her stool.      PT # [phone]

## 2019-10-24 NOTE — TELEPHONE ENCOUNTER
L/m for pt to call.     Inform of rx and she will receive a call from [facility] for scheduling.     Pt # [phone]

## 2019-11-25 ENCOUNTER — OFFICE VISIT (OUTPATIENT)
Dept: GASTROENTEROLOGY | Facility: CLINIC | Age: 39
End: 2019-11-25

## 2019-11-25 VITALS
WEIGHT: 263.6 LBS | BODY MASS INDEX: 45 KG/M2 | TEMPERATURE: 97.9 F | HEIGHT: 64 IN | DIASTOLIC BLOOD PRESSURE: 82 MMHG | SYSTOLIC BLOOD PRESSURE: 140 MMHG

## 2019-11-25 DIAGNOSIS — R10.33 PERIUMBILICAL ABDOMINAL PAIN: Primary | ICD-10-CM

## 2019-11-25 DIAGNOSIS — K62.5 RECTAL BLEEDING: ICD-10-CM

## 2019-11-25 DIAGNOSIS — R19.7 DIARRHEA, UNSPECIFIED TYPE: ICD-10-CM

## 2019-11-25 DIAGNOSIS — E66.01 CLASS 3 SEVERE OBESITY WITHOUT SERIOUS COMORBIDITY WITH BODY MASS INDEX (BMI) OF 45.0 TO 49.9 IN ADULT, UNSPECIFIED OBESITY TYPE (HCC): ICD-10-CM

## 2019-11-25 PROCEDURE — 99204 OFFICE O/P NEW MOD 45 MIN: CPT | Performed by: INTERNAL MEDICINE

## 2019-11-25 RX ORDER — SODIUM CHLORIDE, SODIUM LACTATE, POTASSIUM CHLORIDE, CALCIUM CHLORIDE 600; 310; 30; 20 MG/100ML; MG/100ML; MG/100ML; MG/100ML
30 INJECTION, SOLUTION INTRAVENOUS CONTINUOUS
Status: CANCELLED | OUTPATIENT
Start: 2019-12-18

## 2019-11-25 NOTE — PROGRESS NOTES
Chief Complaint   Patient presents with   • Abdominal Pain   • Rectal Bleeding   • Diarrhea     Subjective   HPI  Melissa Chanel is a 39 y.o. female who presents for new patient evaluation.  She has various GI complaints today.  These symptoms have been present for last year.    She reports Mid/Lower abdominal pain  Blood in stool at least once/week. Occasional black stool.  Daily diarrhea and bloating.  She has gained upwards of 50lbs in last year.   Diarrhea  Bloating    Some improvement with PPI    Used to drink heavily - currently 2 daiquiri 3-4 nights week      Past Medical History:   Diagnosis Date   • Abnormal Pap smear of cervix    • Depression    • Primary biliary cirrhosis (CMS/HCC)        Current Outpatient Medications:   •  ibuprofen (ADVIL,MOTRIN) 400 MG tablet, Take 400 mg by mouth Every 6 (Six) Hours As Needed., Disp: , Rfl:   •  pantoprazole (PROTONIX) 40 MG EC tablet, Take 1 tablet by mouth Daily., Disp: 30 tablet, Rfl: 1  No Known Allergies     Social History     Socioeconomic History   • Marital status:      Spouse name: Not on file   • Number of children: Not on file   • Years of education: Not on file   • Highest education level: Not on file   Tobacco Use   • Smoking status: Current Every Day Smoker     Packs/day: 0.50     Types: Cigarettes   • Smokeless tobacco: Never Used   Substance and Sexual Activity   • Alcohol use: Yes     Comment: Occasional social use   • Drug use: Yes     Types: Marijuana   • Sexual activity: Yes     Partners: Male     Birth control/protection: Surgical     Family History   Problem Relation Age of Onset   • Uterine cancer Mother    • No Known Problems Brother    • No Known Problems Daughter    • No Known Problems Brother    • No Known Problems Daughter      Review of Systems   Constitutional: Positive for fatigue and unexpected weight change.   Gastrointestinal: Positive for abdominal distention, abdominal pain, blood in stool, diarrhea and nausea.   All  other systems reviewed and are negative.       Objective   Vitals:    11/25/19 1528   BP: 140/82   Temp: 97.9 °F (36.6 °C)     Physical Exam   Constitutional: She is oriented to person, place, and time. She appears well-developed and well-nourished.   HENT:   Head: Normocephalic and atraumatic.   Mouth/Throat: Oropharynx is clear and moist.   Eyes: EOM are normal. No scleral icterus.   Neck: Normal range of motion. Neck supple. No thyromegaly present.   Cardiovascular: Normal rate, regular rhythm and normal heart sounds. Exam reveals no gallop and no friction rub.   No murmur heard.  Pulmonary/Chest: Effort normal and breath sounds normal. She has no wheezes. She has no rales. She exhibits no tenderness.   Abdominal: Soft. Bowel sounds are normal. She exhibits no distension. There is no tenderness. There is no rebound and no guarding. No hernia.   Obese     Musculoskeletal: Normal range of motion. She exhibits no edema.   Lymphadenopathy:     She has no cervical adenopathy.   Neurological: She is alert and oriented to person, place, and time.   Skin: Skin is warm and dry.   Psychiatric: She has a normal mood and affect. Judgment and thought content normal.   Vitals reviewed.       Assessment/Plan   Assessment:     1. Periumbilical abdominal pain    2. Diarrhea, unspecified type    3. Rectal bleeding    4. Class 3 severe obesity without serious comorbidity with body mass index (BMI) of 45.0 to 49.9 in adult, unspecified obesity type (CMS/HCC)      Plan:   I recommend bidirectional endoscopic evaluation for further work-up of her various GI complaints.  She should continue Protonix for now.  We will check routine labs today including CBC CMP and TSH.  Recommend reduction of EtOH intake.        Avery Almonte M.D.  [facility], operated by [facility]  [address] [place] [address]  Office: [phone]

## 2019-11-26 LAB
ALBUMIN SERPL-MCNC: 4.1 G/DL (ref 3.5–5.5)
ALBUMIN/GLOB SERPL: 1.9 {RATIO} (ref 1.2–2.2)
ALP SERPL-CCNC: 84 IU/L (ref 39–117)
ALT SERPL-CCNC: 29 IU/L (ref 0–32)
AST SERPL-CCNC: 32 IU/L (ref 0–40)
BASOPHILS # BLD AUTO: 0 X10E3/UL (ref 0–0.2)
BASOPHILS NFR BLD AUTO: 0 %
BILIRUB SERPL-MCNC: 0.2 MG/DL (ref 0–1.2)
BUN SERPL-MCNC: 7 MG/DL (ref 6–20)
BUN/CREAT SERPL: 11 (ref 9–23)
CALCIUM SERPL-MCNC: 9 MG/DL (ref 8.7–10.2)
CHLORIDE SERPL-SCNC: 102 MMOL/L (ref 96–106)
CO2 SERPL-SCNC: 22 MMOL/L (ref 20–29)
CREAT SERPL-MCNC: 0.65 MG/DL (ref 0.57–1)
EOSINOPHIL # BLD AUTO: 0.1 X10E3/UL (ref 0–0.4)
EOSINOPHIL NFR BLD AUTO: 1 %
ERYTHROCYTE [DISTWIDTH] IN BLOOD BY AUTOMATED COUNT: 13.8 % (ref 12.3–15.4)
GLOBULIN SER CALC-MCNC: 2.2 G/DL (ref 1.5–4.5)
GLUCOSE SERPL-MCNC: 94 MG/DL (ref 65–99)
HCT VFR BLD AUTO: 43.7 % (ref 34–46.6)
HGB BLD-MCNC: 14.5 G/DL (ref 11.1–15.9)
IMM GRANULOCYTES # BLD AUTO: 0 X10E3/UL (ref 0–0.1)
IMM GRANULOCYTES NFR BLD AUTO: 0 %
LYMPHOCYTES # BLD AUTO: 2.4 X10E3/UL (ref 0.7–3.1)
LYMPHOCYTES NFR BLD AUTO: 22 %
MCH RBC QN AUTO: 34.3 PG (ref 26.6–33)
MCHC RBC AUTO-ENTMCNC: 33.2 G/DL (ref 31.5–35.7)
MCV RBC AUTO: 103 FL (ref 79–97)
MONOCYTES # BLD AUTO: 0.7 X10E3/UL (ref 0.1–0.9)
MONOCYTES NFR BLD AUTO: 6 %
NEUTROPHILS # BLD AUTO: 7.6 X10E3/UL (ref 1.4–7)
NEUTROPHILS NFR BLD AUTO: 71 %
PLATELET # BLD AUTO: 331 X10E3/UL (ref 150–450)
POTASSIUM SERPL-SCNC: 4.2 MMOL/L (ref 3.5–5.2)
PROT SERPL-MCNC: 6.3 G/DL (ref 6–8.5)
RBC # BLD AUTO: 4.23 X10E6/UL (ref 3.77–5.28)
SODIUM SERPL-SCNC: 141 MMOL/L (ref 134–144)
TSH SERPL DL<=0.005 MIU/L-ACNC: 2.08 UIU/ML (ref 0.45–4.5)
WBC # BLD AUTO: 10.7 X10E3/UL (ref 3.4–10.8)

## 2019-12-12 ENCOUNTER — TELEPHONE (OUTPATIENT)
Dept: GASTROENTEROLOGY | Facility: CLINIC | Age: 39
End: 2019-12-12

## 2019-12-12 NOTE — TELEPHONE ENCOUNTER
Patient called, no answer, left message on VM(identifed patient). Advised as per Dr. Almonte's note. Advised to call back with questions/concerns.

## 2019-12-12 NOTE — TELEPHONE ENCOUNTER
----- Message from Avery Almonte MD sent at 12/12/2019  2:34 PM EST -----  Not anemic but red cells are a little large, sometimes can be sign of B12 deficiency  At time of her scopes we can draw B12 and folate levels to assess

## 2019-12-14 DIAGNOSIS — K29.50 CHRONIC GASTRITIS WITHOUT BLEEDING, UNSPECIFIED GASTRITIS TYPE: ICD-10-CM

## 2019-12-17 RX ORDER — PANTOPRAZOLE SODIUM 40 MG/1
TABLET, DELAYED RELEASE ORAL
Qty: 30 TABLET | Refills: 1 | OUTPATIENT
Start: 2019-12-17

## 2019-12-18 ENCOUNTER — ANESTHESIA EVENT (OUTPATIENT)
Dept: GASTROENTEROLOGY | Facility: HOSPITAL | Age: 39
End: 2019-12-18

## 2019-12-18 ENCOUNTER — ANESTHESIA (OUTPATIENT)
Dept: GASTROENTEROLOGY | Facility: HOSPITAL | Age: 39
End: 2019-12-18

## 2019-12-18 ENCOUNTER — HOSPITAL ENCOUNTER (OUTPATIENT)
Facility: HOSPITAL | Age: 39
Setting detail: HOSPITAL OUTPATIENT SURGERY
Discharge: HOME | End: 2019-12-18
Attending: INTERNAL MEDICINE | Admitting: INTERNAL MEDICINE

## 2019-12-18 VITALS
TEMPERATURE: 98.1 F | BODY MASS INDEX: 46.95 KG/M2 | HEART RATE: 74 BPM | HEIGHT: 63 IN | SYSTOLIC BLOOD PRESSURE: 153 MMHG | RESPIRATION RATE: 16 BRPM | OXYGEN SATURATION: 97 % | WEIGHT: 265 LBS | DIASTOLIC BLOOD PRESSURE: 107 MMHG

## 2019-12-18 DIAGNOSIS — K29.50 CHRONIC GASTRITIS WITHOUT BLEEDING, UNSPECIFIED GASTRITIS TYPE: ICD-10-CM

## 2019-12-18 DIAGNOSIS — R10.33 PERIUMBILICAL ABDOMINAL PAIN: ICD-10-CM

## 2019-12-18 DIAGNOSIS — R19.7 DIARRHEA, UNSPECIFIED TYPE: ICD-10-CM

## 2019-12-18 LAB
B-HCG UR QL: NEGATIVE
INTERNAL NEGATIVE CONTROL: NEGATIVE
INTERNAL POSITIVE CONTROL: POSITIVE
Lab: NORMAL

## 2019-12-18 PROCEDURE — 88305 TISSUE EXAM BY PATHOLOGIST: CPT | Performed by: INTERNAL MEDICINE

## 2019-12-18 PROCEDURE — 81025 URINE PREGNANCY TEST: CPT | Performed by: INTERNAL MEDICINE

## 2019-12-18 PROCEDURE — 45380 COLONOSCOPY AND BIOPSY: CPT | Performed by: INTERNAL MEDICINE

## 2019-12-18 PROCEDURE — 25010000002 PROPOFOL 10 MG/ML EMULSION: Performed by: ANESTHESIOLOGY

## 2019-12-18 PROCEDURE — 43239 EGD BIOPSY SINGLE/MULTIPLE: CPT | Performed by: INTERNAL MEDICINE

## 2019-12-18 RX ORDER — PROPOFOL 10 MG/ML
VIAL (ML) INTRAVENOUS AS NEEDED
Status: DISCONTINUED | OUTPATIENT
Start: 2019-12-18 | End: 2019-12-18 | Stop reason: SURG

## 2019-12-18 RX ORDER — LIDOCAINE HYDROCHLORIDE 20 MG/ML
INJECTION, SOLUTION INFILTRATION; PERINEURAL AS NEEDED
Status: DISCONTINUED | OUTPATIENT
Start: 2019-12-18 | End: 2019-12-18 | Stop reason: SURG

## 2019-12-18 RX ORDER — PROPOFOL 10 MG/ML
VIAL (ML) INTRAVENOUS CONTINUOUS PRN
Status: DISCONTINUED | OUTPATIENT
Start: 2019-12-18 | End: 2019-12-18 | Stop reason: SURG

## 2019-12-18 RX ORDER — SODIUM CHLORIDE, SODIUM LACTATE, POTASSIUM CHLORIDE, CALCIUM CHLORIDE 600; 310; 30; 20 MG/100ML; MG/100ML; MG/100ML; MG/100ML
30 INJECTION, SOLUTION INTRAVENOUS CONTINUOUS
Status: DISCONTINUED | OUTPATIENT
Start: 2019-12-18 | End: 2019-12-18 | Stop reason: HOSPADM

## 2019-12-18 RX ORDER — SODIUM CHLORIDE 0.9 % (FLUSH) 0.9 %
3 SYRINGE (ML) INJECTION EVERY 12 HOURS SCHEDULED
Status: DISCONTINUED | OUTPATIENT
Start: 2019-12-18 | End: 2019-12-18 | Stop reason: HOSPADM

## 2019-12-18 RX ORDER — SODIUM CHLORIDE 0.9 % (FLUSH) 0.9 %
10 SYRINGE (ML) INJECTION AS NEEDED
Status: DISCONTINUED | OUTPATIENT
Start: 2019-12-18 | End: 2019-12-18 | Stop reason: HOSPADM

## 2019-12-18 RX ORDER — PANTOPRAZOLE SODIUM 40 MG/1
40 TABLET, DELAYED RELEASE ORAL
Qty: 30 TABLET | Refills: 1 | Status: SHIPPED | OUTPATIENT
Start: 2019-12-18 | End: 2022-11-09

## 2019-12-18 RX ADMIN — PROPOFOL 30 MG: 10 INJECTION, EMULSION INTRAVENOUS at 14:33

## 2019-12-18 RX ADMIN — PROPOFOL 20 MG: 10 INJECTION, EMULSION INTRAVENOUS at 14:27

## 2019-12-18 RX ADMIN — PROPOFOL 150 MG: 10 INJECTION, EMULSION INTRAVENOUS at 14:21

## 2019-12-18 RX ADMIN — PROPOFOL 30 MG: 10 INJECTION, EMULSION INTRAVENOUS at 14:29

## 2019-12-18 RX ADMIN — PROPOFOL 20 MG: 10 INJECTION, EMULSION INTRAVENOUS at 14:25

## 2019-12-18 RX ADMIN — LIDOCAINE HYDROCHLORIDE 70 MG: 20 INJECTION, SOLUTION INFILTRATION; PERINEURAL at 14:21

## 2019-12-18 RX ADMIN — SODIUM CHLORIDE, POTASSIUM CHLORIDE, SODIUM LACTATE AND CALCIUM CHLORIDE 30 ML/HR: 600; 310; 30; 20 INJECTION, SOLUTION INTRAVENOUS at 13:40

## 2019-12-18 RX ADMIN — PROPOFOL 30 MG: 10 INJECTION, EMULSION INTRAVENOUS at 14:23

## 2019-12-18 RX ADMIN — PROPOFOL 140 MCG/KG/MIN: 10 INJECTION, EMULSION INTRAVENOUS at 14:21

## 2019-12-18 NOTE — DISCHARGE INSTRUCTIONS
Upper Endoscopy, Adult, Care After  This sheet gives you information about how to care for yourself after your procedure. Your health care provider may also give you more specific instructions. If you have problems or questions, contact your health care provider.  What can I expect after the procedure?  After the procedure, it is common to have:  · A sore throat.  · Mild stomach pain or discomfort.  · Bloating.  · Nausea.  Follow these instructions at home:    · Follow instructions from your health care provider about what to eat or drink after your procedure.  · Return to your normal activities as told by your health care provider. Ask your health care provider what activities are safe for you.  · Take over-the-counter and prescription medicines only as told by your health care provider.  · Do not drive for 24 hours if you were given a sedative during your procedure.  · Keep all follow-up visits as told by your health care provider. This is important.  Contact a health care provider if you have:  · A sore throat that lasts longer than one day.  · Trouble swallowing.  Get help right away if:  · You vomit blood or your vomit looks like coffee grounds.  · You have:  ? A fever.  ? Bloody, black, or tarry stools.  ? A severe sore throat or you cannot swallow.  ? Difficulty breathing.  ? Severe pain in your chest or abdomen.  Summary  · After the procedure, it is common to have a sore throat, mild stomach discomfort, bloating, and nausea.  · Do not drive for 24 hours if you were given a sedative during the procedure.  · Follow instructions from your health care provider about what to eat or drink after your procedure.  · Return to your normal activities as told by your health care provider.  This information is not intended to replace advice given to you by your health care provider. Make sure you discuss any questions you have with your health care provider.  Document Released: 06/18/2013 Document Revised: 05/20/2019  Document Reviewed: 05/20/2019  [employer] Interactive Patient Education © 2019 [employer]  Gastritis, Adult  Gastritis is inflammation of the stomach. There are two kinds of gastritis:  · Acute gastritis. This kind develops suddenly.  · Chronic gastritis. This kind is much more common and lasts for a long time.  Gastritis happens when the lining of the stomach becomes weak or gets damaged. Without treatment, gastritis can lead to stomach bleeding and ulcers.  What are the causes?  This condition may be caused by:  · An infection.  · Drinking too much alcohol.  · Certain medicines. These include steroids, antibiotics, and some over-the-counter medicines, such as aspirin or ibuprofen.  · Having too much acid in the stomach.  · A disease of the intestines or stomach.  · Stress.  · An allergic reaction.  · Crohn's disease.  · Some cancer treatments (radiation).  Sometimes the cause of this condition is not known.  What are the signs or symptoms?  Symptoms of this condition include:  · Pain or a burning sensation in the upper abdomen.  · Nausea.  · Vomiting.  · An uncomfortable feeling of fullness after eating.  · Weight loss.  · Bad breath.  · Blood in your vomit or stools.  In some cases, there are no symptoms.  How is this diagnosed?  This condition may be diagnosed with:  · Your medical history and a description of your symptoms.  · A physical exam.  · Tests. These can include:  ? Blood tests.  ? Stool tests.  ? A test in which a thin, flexible instrument with a light and a camera is passed down the esophagus and into the stomach (upper endoscopy).  ? A test in which a sample of tissue is taken for testing (biopsy).  How is this treated?  This condition may be treated with medicines. The medicines that are used vary depending on the cause of the gastritis:  · If the condition is caused by a bacterial infection, you may be given antibiotic medicines.  · If the condition is caused by too much acid in the stomach, you  may be given medicines called H2 blockers, proton pump inhibitors, or antacids.  Treatment may also involve stopping the use of certain medicines, such as aspirin, ibuprofen, or other NSAIDs.  Follow these instructions at home:  Medicines  · Take over-the-counter and prescription medicines only as told by your health care provider.  · If you were prescribed an antibiotic medicine, take it as told by your health care provider. Do not stop taking the antibiotic even if you start to feel better.  Eating and drinking    · Eat small, frequent meals instead of large meals.  · Avoid foods and drinks that make your symptoms worse.  · Drink enough fluid to keep your urine pale yellow.  Alcohol use  · Do not drink alcohol if:  ? Your health care provider tells you not to drink.  ? You are pregnant, may be pregnant, or are planning to become pregnant.  · If you drink alcohol:  ? Limit your use to:  § 0-1 drink a day for women.  § 0-2 drinks a day for men.  ? Be aware of how much alcohol is in your drink. In the [place] one drink equals one 12 oz bottle of beer (355 mL), one 5 oz glass of wine (148 mL), or one 1½ oz glass of hard liquor (44 mL).  General instructions  · Talk with your health care provider about ways to manage stress, such as getting regular exercise or practicing deep breathing, meditation, or yoga.  · Do not use any products that contain nicotine or tobacco, such as cigarettes and e-cigarettes. If you need help quitting, ask your health care provider.  · Keep all follow-up visits as told by your health care provider. This is important.  Contact a health care provider if:  · Your symptoms get worse.  · Your symptoms return after treatment.  Get help right away if:  · You vomit blood or material that looks like coffee grounds.  · You have black or dark red stools.  · You are unable to keep fluids down.  · Your abdominal pain gets worse.  · You have a fever.  · You do not feel better after one  week.  Summary  · Gastritis is inflammation of the lining of the stomach that can occur suddenly (acute) or develop slowly over time (chronic).  · This condition is diagnosed with a medical history, a physical exam, or tests.  · This condition may be treated with medicines to treat infection or medicines to reduce the amount of acid in your stomach.  · Follow your health care provider's instructions about taking medicines, making changes to your diet, and knowing when to call for help.  This information is not intended to replace advice given to you by your health care provider. Make sure you discuss any questions you have with your health care provider.  Document Released: 12/12/2002 Document Revised: 05/07/2019 Document Reviewed: 05/07/2019  [employer] Interactive Patient Education © 2019 [employer]  Colonoscopy, Adult, Care After  This sheet gives you information about how to care for yourself after your procedure. Your doctor may also give you more specific instructions. If you have problems or questions, call your doctor.  What can I expect after the procedure?  After the procedure, it is common to have:  · A small amount of blood in your poop for 24 hours.  · Some gas.  · Mild cramping or bloating in your belly.  Follow these instructions at home:  General instructions  · For the first 24 hours after the procedure:  ? Do not drive or use machinery.  ? Do not sign important documents.  ? Do not drink alcohol.  ? Do your daily activities more slowly than normal.  ? Eat foods that are soft and easy to digest.  · Take over-the-counter or prescription medicines only as told by your doctor.  To help cramping and bloating:    · Try walking around.  · Put heat on your belly (abdomen) as told by your doctor. Use a heat source that your doctor recommends, such as a moist heat pack or a heating pad.  ? Put a towel between your skin and the heat source.  ? Leave the heat on for 20-30 minutes.  ? Remove the heat if your  skin turns bright red. This is especially important if you cannot feel pain, heat, or cold. You can get burned.  Eating and drinking    · Drink enough fluid to keep your pee (urine) clear or pale yellow.  · Return to your normal diet as told by your doctor. Avoid heavy or fried foods that are hard to digest.  · Avoid drinking alcohol for as long as told by your doctor.  Contact a doctor if:  · You have blood in your poop (stool) 2-3 days after the procedure.  Get help right away if:  · You have more than a small amount of blood in your poop.  · You see large clumps of tissue (blood clots) in your poop.  · Your belly is swollen.  · You feel sick to your stomach (nauseous).  · You throw up (vomit).  · You have a fever.  · You have belly pain that gets worse, and medicine does not help your pain.  Summary  · After the procedure, it is common to have a small amount of blood in your poop. You may also have mild cramping and bloating in your belly.  · For the first 24 hours after the procedure, do not drive or use machinery, do not sign important documents, and do not drink alcohol.  · Get help right away if you have a lot of blood in your poop, feel sick to your stomach, have a fever, or have more belly pain.  This information is not intended to replace advice given to you by your health care provider. Make sure you discuss any questions you have with your health care provider.  Document Released: 01/20/2012 Document Revised: 10/18/2018 Document Reviewed: 09/11/2017  [employer] Interactive Patient Education © 2019 [employer]  Anal Fissure, Adult    An anal fissure is a small tear or crack in the tissue of the anus. Bleeding from a fissure usually stops on its own within a few minutes. However, bleeding will often occur again with each bowel movement until the fissure heals.  What are the causes?  This condition is usually caused by passing a large or hard stool (feces). Other causes include:  · Constipation.  · Frequent  diarrhea.  · Inflammatory bowel disease (Crohn's disease or ulcerative colitis).  · Childbirth.  · Infections.  · Anal sex.  What are the signs or symptoms?  Symptoms of this condition include:  · Bleeding from the rectum.  · Small amounts of blood seen on your stool, on the toilet paper, or in the toilet after a bowel movement. The blood coats the outside of the stool and is not mixed with the stool.  · Painful bowel movements.  · Itching or irritation around the anus.  How is this diagnosed?  A health care provider may diagnose this condition by closely examining the anal area. An anal fissure can usually be seen with careful inspection. In some cases, a rectal exam may be performed, or a short tube (anoscope) may be used to examine the anal canal.  How is this treated?  Initial treatment for this condition may include:  · Taking steps to avoid constipation. This may include making changes to your diet, such as increasing your intake of fiber or fluid.  · Taking fiber supplements. These supplements can soften your stool to help make bowel movements easier. Your health care provider may also prescribe a stool softener if your stool is hard.  · Taking sitz baths. This may help to heal the tear.  · Using medicated creams or ointments. These may be prescribed to lessen discomfort.  Treatments that are sometimes used if initial treatments do not work well or if the condition is more severe may include:  · Botulinum injection.  · Surgery to repair the fissure.  Follow these instructions at home:  Eating and drinking    · Avoid foods that may cause constipation, such as bananas, milk, and other dairy products.  · Eat all fruits, except bananas.  · Drink enough fluid to keep your urine pale yellow.  · Eat foods that are high in fiber, such as beans, whole grains, and fresh fruits and vegetables.  General instructions    · Take over-the-counter and prescription medicines only as told by your health care provider.  · Use  creams or ointments only as told by your health care provider.  · Keep the anal area clean and dry.  · Take sitz baths as told by your health care provider. Do not use soap in the sitz baths.  · Keep all follow-up visits as told by your health care provider. This is important.  Contact a health care provider if you have:  · More bleeding.  · A fever.  · Diarrhea that is mixed with blood.  · Pain that continues.  · Ongoing problems that are getting worse rather than better.  Summary  · An anal fissure is a small tear or crack in the tissue of the anus. This condition is usually caused by passing a large or hard stool (feces). Other causes include constipation and frequent diarrhea.  · Initial treatment for this condition may include taking steps to avoid constipation, such as increasing your intake of fiber or fluid.  · Follow instructions for care as told by your health care provider.  · Contact your health care provider if you have more bleeding or your problem is getting worse rather than better.  · Keep all follow-up visits as told by your health care provider. This is important.  This information is not intended to replace advice given to you by your health care provider. Make sure you discuss any questions you have with your health care provider.  Document Released: 12/18/2006 Document Revised: 05/30/2019 Document Reviewed: 05/30/2019  [employer] Interactive Patient Education © 2019 [employer]

## 2019-12-18 NOTE — ANESTHESIA POSTPROCEDURE EVALUATION
"Patient: Melissa Chanel    Procedure Summary     Date:  12/18/19 Room / Location:   [facility] /  [facility]    Anesthesia Start:  1415 Anesthesia Stop:  1448    Procedures:       ESOPHAGOGASTRODUODENOSCOPY WITH BIOPSIES (N/A Esophagus)      COLONOSCOPY WITH BIOPSIES (N/A ) Diagnosis:       Periumbilical abdominal pain      Diarrhea, unspecified type      (Periumbilical abdominal pain [R10.33])      (Diarrhea, unspecified type [R19.7])    Surgeon:  Avery Almonte MD Provider:  Avery Hilliard MD    Anesthesia Type:  MAC ASA Status:  3          Anesthesia Type: MAC    Vitals  Vitals Value Taken Time   /107 12/18/2019  3:11 PM   Temp     Pulse 74 12/18/2019  3:11 PM   Resp 16 12/18/2019  3:11 PM   SpO2 97 % 12/18/2019  3:11 PM           Post Anesthesia Care and Evaluation    Patient location during evaluation: bedside  Patient participation: complete - patient participated  Level of consciousness: awake and alert  Pain management: adequate  Airway patency: patent  Anesthetic complications: No anesthetic complications    Cardiovascular status: acceptable  Respiratory status: acceptable  Hydration status: acceptable    Comments: BP (!) 153/107 (BP Location: Left arm, Patient Position: Lying)   Pulse 74   Temp 36.7 °C (98.1 °F) (Oral)   Resp 16   Ht 160 cm (63\")   Wt 120 kg (265 lb)   SpO2 97%   BMI 46.94 kg/m²       "

## 2019-12-18 NOTE — ANESTHESIA PREPROCEDURE EVALUATION
Anesthesia Evaluation     Patient summary reviewed   no history of anesthetic complications:  NPO Solid Status: > 8 hours  NPO Liquid Status: > 2 hours           Airway   Mallampati: II  TM distance: >3 FB  Neck ROM: full  Large neck circumference  Dental      Pulmonary     breath sounds clear to auscultation  (+) a smoker Current Abstained day of surgery,   (-) shortness of breath  Cardiovascular   Exercise tolerance: good (4-7 METS)    Rhythm: regular  Rate: normal    (-) angina, TERRAZAS      Neuro/Psych  (+) psychiatric history Depression,     GI/Hepatic/Renal/Endo    (+) morbid obesity,  liver disease,     Musculoskeletal     Abdominal    Substance History   (+) alcohol use, drug use (MJ)     OB/GYN          Other                      Anesthesia Plan    ASA 3     MAC   (MAC anesthesia discussed with patient and/or patient representative. Risks (including but not limited to intra-op awareness), benefits, and alternatives were discussed. Understanding was voiced with an agreement to proceed with a MAC technique and General as a backup option.   )  intravenous induction     Anesthetic plan, all risks, benefits, and alternatives have been provided, discussed and informed consent has been obtained with: patient and spouse/significant other.

## 2019-12-20 LAB
CYTO UR: NORMAL
LAB AP CASE REPORT: NORMAL
PATH REPORT.FINAL DX SPEC: NORMAL
PATH REPORT.GROSS SPEC: NORMAL

## 2020-01-29 ENCOUNTER — TELEPHONE (OUTPATIENT)
Dept: GASTROENTEROLOGY | Facility: CLINIC | Age: 40
End: 2020-01-29

## 2020-01-29 NOTE — TELEPHONE ENCOUNTER
----- Message from Avery Almonte MD sent at 1/6/2020 11:39 AM EST -----  Gastritis, no H pylori  Negative colon biopsies    O/V with BG in 4-6 weeks

## 2020-09-30 ENCOUNTER — OFFICE VISIT (OUTPATIENT)
Dept: OBSTETRICS AND GYNECOLOGY | Facility: CLINIC | Age: 40
End: 2020-09-30

## 2020-09-30 VITALS
DIASTOLIC BLOOD PRESSURE: 99 MMHG | BODY MASS INDEX: 45.54 KG/M2 | SYSTOLIC BLOOD PRESSURE: 172 MMHG | HEIGHT: 63 IN | WEIGHT: 257 LBS

## 2020-09-30 DIAGNOSIS — M54.50 LOW BACK PAIN, UNSPECIFIED BACK PAIN LATERALITY, UNSPECIFIED CHRONICITY, UNSPECIFIED WHETHER SCIATICA PRESENT: ICD-10-CM

## 2020-09-30 DIAGNOSIS — R10.9 ABDOMINAL PAIN, UNSPECIFIED ABDOMINAL LOCATION: ICD-10-CM

## 2020-09-30 DIAGNOSIS — N93.9 ABNORMAL UTERINE BLEEDING (AUB): Primary | ICD-10-CM

## 2020-09-30 DIAGNOSIS — N30.01 ACUTE CYSTITIS WITH HEMATURIA: ICD-10-CM

## 2020-09-30 LAB
B-HCG UR QL: NEGATIVE
BILIRUB BLD-MCNC: ABNORMAL MG/DL
CLARITY, POC: CLEAR
COLOR UR: YELLOW
GLUCOSE UR STRIP-MCNC: NEGATIVE MG/DL
INTERNAL NEGATIVE CONTROL: NEGATIVE
INTERNAL POSITIVE CONTROL: POSITIVE
KETONES UR QL: NEGATIVE
LEUKOCYTE EST, POC: ABNORMAL
Lab: NORMAL
NITRITE UR-MCNC: POSITIVE MG/ML
PH UR: 5.5 [PH] (ref 5–8)
PROT UR STRIP-MCNC: ABNORMAL MG/DL
RBC # UR STRIP: ABNORMAL /UL
SP GR UR: 1.02 (ref 1–1.03)
UROBILINOGEN UR QL: NORMAL

## 2020-09-30 PROCEDURE — 99214 OFFICE O/P EST MOD 30 MIN: CPT | Performed by: NURSE PRACTITIONER

## 2020-09-30 PROCEDURE — 81025 URINE PREGNANCY TEST: CPT | Performed by: NURSE PRACTITIONER

## 2020-09-30 RX ORDER — DOXYCYCLINE HYCLATE 100 MG/1
100 CAPSULE ORAL 2 TIMES DAILY
Qty: 14 CAPSULE | Refills: 0 | Status: SHIPPED | OUTPATIENT
Start: 2020-09-30 | End: 2022-11-09

## 2020-09-30 RX ORDER — OMEPRAZOLE 20 MG/1
20 CAPSULE, DELAYED RELEASE ORAL DAILY
COMMUNITY
End: 2022-11-09

## 2020-09-30 NOTE — PROGRESS NOTES
Chief Complaint   Patient presents with   • Menstrual Problem     Pt states she has been bleeding for past month. Pt has had a tubal.         SUBJECTIVE:     Melissa Chanel is a 40 y.o.  who presents with AUB for one month. Prior tubal ligation. This is a new problem. LMP started one month ago.  Periods are regular every 28-30 days, typically lasting 2 days with painful cramping. She reports heavy bleeding and lower abdominal pain over the last week. Bleeding has not been happening every day, appears to be intermittent. No bleeding today or yesterday, however the day before she had vaginal bleeding that soaked through her clothing. She is sexually active, denies new partners. She did have a work up 3/2019 with Dr Garcia labs and u/s normal. She was referred to GI at that time for further evaluation of pain.    BP elevated today, she is not under care for HTN. She denies chest pain or palpitations, she does experience SOA and BLE. She has been seen by Dr Champagne in the past and states se will make an appt to be seen soon. She reports a hx of HTN, but she has never followed up for treatment    She denies dysuria, frequency or urgency. She reports + low back pain, this is not a new symptom.    This is my first time meeting Melissa Chanel      Past Medical History:   Diagnosis Date   • Abnormal Pap smear of cervix    • Depression    • Primary biliary cirrhosis (CMS/HCC)       Past Surgical History:   Procedure Laterality Date   •  SECTION     • CHOLECYSTECTOMY     • COLONOSCOPY W/ POLYPECTOMY N/A 2019    Procedure: COLONOSCOPY WITH BIOPSIES;  Surgeon: Avery Almonte MD;  Location: [facility] ENDOSCOPY;  Service: Gastroenterology   • ENDOSCOPY N/A 2019    Procedure: ESOPHAGOGASTRODUODENOSCOPY WITH BIOPSIES;  Surgeon: Avery Almonte MD;  Location: [facility] ENDOSCOPY;  Service: Gastroenterology   • TONSILLECTOMY     • TUBAL ABDOMINAL LIGATION      Likely Fallope rings   • UPPER  "GASTROINTESTINAL ENDOSCOPY  2016      Social History     Tobacco Use   • Smoking status: Current Every Day Smoker     Packs/day: 0.50     Types: Cigarettes   • Smokeless tobacco: Never Used   Substance Use Topics   • Alcohol use: Yes     Comment: Occasional social use   • Drug use: Yes     Types: Marijuana     OB History    Para Term  AB Living   2 2 2     2   SAB TAB Ectopic Molar Multiple Live Births                    # Outcome Date GA Lbr Billy/2nd Weight Sex Delivery Anes PTL Lv   2 Term      Vag-Spont      1 Term      CS-LTranv           Review of Systems   Constitutional: Negative for chills, fatigue and fever.   Respiratory: Positive for shortness of breath (intermittently, not at present). Negative for chest tightness.    Cardiovascular: Positive for leg swelling. Negative for chest pain and palpitations.   Gastrointestinal: Positive for abdominal pain and nausea. Negative for abdominal distention, constipation, diarrhea and vomiting.   Endocrine: Negative for cold intolerance, heat intolerance, polydipsia, polyphagia and polyuria.   Genitourinary: Positive for menstrual problem and vaginal bleeding. Negative for difficulty urinating, dyspareunia, dysuria, frequency, hematuria, pelvic pain, urgency, vaginal discharge and vaginal pain.   Musculoskeletal: Positive for back pain (Left lower back). Negative for gait problem.   Skin: Negative for color change, pallor and rash.   Neurological: Negative for dizziness and headaches.   Hematological: Negative for adenopathy.   Psychiatric/Behavioral: Negative for behavioral problems.       OBJECTIVE:   Vitals:    20 1526   BP: 172/99   Weight: 117 kg (257 lb)   Height: 160 cm (63\")        Physical Exam  Vitals signs and nursing note reviewed.   Constitutional:       Appearance: Normal appearance.   HENT:      Head: Normocephalic and atraumatic.   Eyes:      Pupils: Pupils are equal, round, and reactive to light.   Neck:      Musculoskeletal: Normal " range of motion.   Cardiovascular:      Rate and Rhythm: Normal rate.   Pulmonary:      Effort: Pulmonary effort is normal. No respiratory distress.   Abdominal:      General: There is no distension.      Palpations: Abdomen is soft. There is no mass.      Tenderness: There is abdominal tenderness. There is guarding. There is no right CVA tenderness, left CVA tenderness or rebound.      Hernia: No hernia is present. There is no hernia in the left inguinal area or right inguinal area.   Genitourinary:     Exam position: Lithotomy position.      Pubic Area: No rash or pubic lice.       Labia:         Right: No rash, tenderness, lesion or injury.         Left: No rash, tenderness, lesion or injury.       Urethra: No prolapse, urethral pain, urethral swelling or urethral lesion.      Vagina: No signs of injury and foreign body. No vaginal discharge, erythema, tenderness, bleeding, lesions or prolapsed vaginal walls.      Cervix: No cervical motion tenderness, discharge, friability, lesion, erythema, cervical bleeding or eversion.      Uterus: Tender. Not deviated, not enlarged, not fixed and no uterine prolapse.       Adnexa:         Right: Tenderness present. No mass or fullness.          Left: Tenderness present. No mass or fullness.     Musculoskeletal: Normal range of motion.         General: Swelling present. No tenderness.   Lymphadenopathy:      Lower Body: No right inguinal adenopathy. No left inguinal adenopathy.   Skin:     General: Skin is warm and dry.      Coloration: Skin is not jaundiced or pale.      Findings: No bruising, erythema, lesion or rash.   Neurological:      General: No focal deficit present.      Mental Status: She is alert and oriented to person, place, and time.      Cranial Nerves: No cranial nerve deficit.   Psychiatric:         Mood and Affect: Mood normal.         Behavior: Behavior normal.         Thought Content: Thought content normal.         Judgment: Judgment normal.          ASSESSMENT:   1) Lower abdominal pain  2) AUB  3) UTI  4) HTN    PLAN:   Urine dip + nitrites, sent for culture  Concerned for PID given degree of diffuse lower abdominal pain. Will start doxycyline today. Encouraged to go to ED if worsening pain, fever, chills, N&V  Transvaginal u/s normal  Encouraged f/u with PCP as soon as possible to f/u HTN. Discussed risks of uncontrolled HTN including stroke, heart attack, and death  TSH, Prolactin, A1C, CBC, and NuSwab today for AUB    Has not had annual exam in several years, recommend she schedule within the next 4-6 weeks    Follow up:1 week or sooner if worsening symptoms      Holly Garnica, APRN  9/30/2020  15:34 EDT

## 2020-10-01 LAB
BASOPHILS # BLD AUTO: 0 X10E3/UL (ref 0–0.2)
BASOPHILS NFR BLD AUTO: 0 %
EOSINOPHIL # BLD AUTO: 0.1 X10E3/UL (ref 0–0.4)
EOSINOPHIL NFR BLD AUTO: 1 %
ERYTHROCYTE [DISTWIDTH] IN BLOOD BY AUTOMATED COUNT: 13 % (ref 11.7–15.4)
HBA1C MFR BLD: 4.7 % (ref 4.8–5.6)
HCT VFR BLD AUTO: 44.2 % (ref 34–46.6)
HGB BLD-MCNC: 15.1 G/DL (ref 11.1–15.9)
IMM GRANULOCYTES # BLD AUTO: 0.1 X10E3/UL (ref 0–0.1)
IMM GRANULOCYTES NFR BLD AUTO: 1 %
LYMPHOCYTES # BLD AUTO: 2.3 X10E3/UL (ref 0.7–3.1)
LYMPHOCYTES NFR BLD AUTO: 23 %
MCH RBC QN AUTO: 35 PG (ref 26.6–33)
MCHC RBC AUTO-ENTMCNC: 34.2 G/DL (ref 31.5–35.7)
MCV RBC AUTO: 102 FL (ref 79–97)
MONOCYTES # BLD AUTO: 0.6 X10E3/UL (ref 0.1–0.9)
MONOCYTES NFR BLD AUTO: 6 %
NEUTROPHILS # BLD AUTO: 6.9 X10E3/UL (ref 1.4–7)
NEUTROPHILS NFR BLD AUTO: 69 %
PLATELET # BLD AUTO: 434 X10E3/UL (ref 150–450)
PROLACTIN SERPL-MCNC: 14.9 NG/ML (ref 4.8–23.3)
RBC # BLD AUTO: 4.32 X10E6/UL (ref 3.77–5.28)
TSH SERPL DL<=0.005 MIU/L-ACNC: 1.44 UIU/ML (ref 0.45–4.5)
WBC # BLD AUTO: 10 X10E3/UL (ref 3.4–10.8)

## 2020-10-02 LAB
BACTERIA UR CULT: ABNORMAL
OTHER ANTIBIOTIC SUSC ISLT: ABNORMAL

## 2020-10-03 LAB
A VAGINAE DNA VAG QL NAA+PROBE: ABNORMAL SCORE
BVAB2 DNA VAG QL NAA+PROBE: ABNORMAL SCORE
C ALBICANS DNA VAG QL NAA+PROBE: NEGATIVE
C GLABRATA DNA VAG QL NAA+PROBE: NEGATIVE
C TRACH DNA VAG QL NAA+PROBE: NEGATIVE
MEGA1 DNA VAG QL NAA+PROBE: ABNORMAL SCORE
N GONORRHOEA DNA VAG QL NAA+PROBE: NEGATIVE
T VAGINALIS DNA VAG QL NAA+PROBE: NEGATIVE

## 2020-10-05 ENCOUNTER — TELEPHONE (OUTPATIENT)
Dept: OBSTETRICS AND GYNECOLOGY | Facility: CLINIC | Age: 40
End: 2020-10-05

## 2020-10-05 NOTE — TELEPHONE ENCOUNTER
----- Message from MATT Elizalde sent at 10/5/2020  9:39 AM EDT -----  Urine culture + E. Coli. The antibiotic she was given should have treated this. She should let us know if she is not improving. Thanks

## 2020-10-13 ENCOUNTER — TELEPHONE (OUTPATIENT)
Dept: OBSTETRICS AND GYNECOLOGY | Facility: CLINIC | Age: 40
End: 2020-10-13

## 2022-11-09 ENCOUNTER — OFFICE VISIT (OUTPATIENT)
Dept: OBSTETRICS AND GYNECOLOGY | Facility: CLINIC | Age: 42
End: 2022-11-09

## 2022-11-09 VITALS
WEIGHT: 236 LBS | DIASTOLIC BLOOD PRESSURE: 86 MMHG | BODY MASS INDEX: 40.29 KG/M2 | SYSTOLIC BLOOD PRESSURE: 104 MMHG | HEIGHT: 64 IN

## 2022-11-09 DIAGNOSIS — N91.2 AMENORRHEA: ICD-10-CM

## 2022-11-09 DIAGNOSIS — R10.2 PELVIC PAIN: ICD-10-CM

## 2022-11-09 DIAGNOSIS — N64.4 BREAST PAIN: ICD-10-CM

## 2022-11-09 DIAGNOSIS — Z01.419 WOMEN'S ANNUAL ROUTINE GYNECOLOGICAL EXAMINATION: Primary | ICD-10-CM

## 2022-11-09 DIAGNOSIS — Z00.00 ENCOUNTER FOR MEDICAL EXAMINATION TO ESTABLISH CARE: ICD-10-CM

## 2022-11-09 DIAGNOSIS — R11.2 NAUSEA AND VOMITING, UNSPECIFIED VOMITING TYPE: ICD-10-CM

## 2022-11-09 DIAGNOSIS — N89.8 VAGINAL IRRITATION: ICD-10-CM

## 2022-11-09 LAB
B-HCG UR QL: NEGATIVE
BILIRUB BLD-MCNC: ABNORMAL MG/DL
EXPIRATION DATE: NORMAL
GLUCOSE UR STRIP-MCNC: ABNORMAL MG/DL
INTERNAL NEGATIVE CONTROL: NEGATIVE
INTERNAL POSITIVE CONTROL: POSITIVE
KETONES UR QL: ABNORMAL
LEUKOCYTE EST, POC: ABNORMAL
Lab: NORMAL
NITRITE UR-MCNC: NEGATIVE MG/ML
PH UR: 7.5 [PH] (ref 5–8)
PROT UR STRIP-MCNC: ABNORMAL MG/DL
RBC # UR STRIP: NEGATIVE /UL
SP GR UR: 1.02 (ref 1–1.03)
UROBILINOGEN UR QL: ABNORMAL

## 2022-11-09 PROCEDURE — 81025 URINE PREGNANCY TEST: CPT | Performed by: NURSE PRACTITIONER

## 2022-11-09 PROCEDURE — 99396 PREV VISIT EST AGE 40-64: CPT | Performed by: NURSE PRACTITIONER

## 2022-11-09 PROCEDURE — 99214 OFFICE O/P EST MOD 30 MIN: CPT | Performed by: NURSE PRACTITIONER

## 2022-11-09 RX ORDER — ONDANSETRON 4 MG/1
4 TABLET, FILM COATED ORAL EVERY 8 HOURS PRN
Qty: 30 TABLET | Refills: 1 | Status: SHIPPED | OUTPATIENT
Start: 2022-11-09 | End: 2023-11-09

## 2022-11-09 RX ORDER — NYSTATIN AND TRIAMCINOLONE ACETONIDE 100000; 1 [USP'U]/G; MG/G
1 OINTMENT TOPICAL 2 TIMES DAILY
Qty: 30 G | Refills: 1 | Status: SHIPPED | OUTPATIENT
Start: 2022-11-09 | End: 2022-11-16

## 2022-11-09 NOTE — PROGRESS NOTES
"GYN Annual Exam     Chief Complaint   Patient presents with   • Gynecologic Exam     Here for annual exam. C/o pelvic pain and nausea x 8 months.        HPI    Melissa Chanel is a 42 y.o. female who presents for annual well woman exam.  She has not been sexually active in 8-9 months. Denies new partners. Performing SBE:does not complete. C/o bilateral breast pain X6 months. Denies dimpling, puckering, nipple drainage, or masses. She is not drinking caffeine. C/o trouble sleeping. C/o vaginal \"burning and blisters\" for 8-9 months. She has been treating with powder with no improvement.     C/o pelvic pain, nausea, and vomiting for 8-9 months. No menses in approximately 10 months. She was seen for AUB 2020 by me with normal labs and TVUS at that time. She has been seen by PCP for ongoing N&V, reports PCP sent her to the hospital where she was diagnosed with \"an infection\" and \"erosion\" of esophagus. I am not able to see detailed reports at this time, but can see an ED visit from 2022. CT showed colitis and hepatic stenosis. She was treated with levaquin and flagyl. As well as phenergan and zofran for N&V. Reports anti-emetics were helpful, but she has run out these medications causing N&V to return. She is vomiting and retching today in office. Denies recent fevers or chills. Reports last seen  PCP (Mahi) 1 month ago. She would like to find new PCP. States having trouble getting out of bed due to N&V and fatigeu. She is not drinking very much during the day. States it has been \"months\" since she has held solid food down. C/o unintentional weight loss, reports weight \"is the same as it was when I was here last\" but reports she was 270lb just a few weeks ago. Last seen by GI \"couple of months ago\" I am unable to see any visit notes with GI at this time.    OB History        2    Para   2    Term   2            AB        Living   2       SAB        IAB        Ectopic        Molar        Multiple "        Live Births                    LMP- approx 10 months ago  Current contraception: tubal ligation  Last Pap- 2017 NIL, HPV negative   History of abnormal Pap smear: yes  Colonoscopy: 2019  History of STD-denies  Family history of uterine, colon or ovarian cancer: yes - mother, uterine  Family history of breast cancer: no  Mammogram- never    Past Medical History:   Diagnosis Date   • Abnormal Pap smear of cervix    • Depression    • Primary biliary cirrhosis (HCC)        Past Surgical History:   Procedure Laterality Date   • ANKLE SURGERY Right    •  SECTION     • CHOLECYSTECTOMY     • COLONOSCOPY W/ POLYPECTOMY N/A 2019    Procedure: COLONOSCOPY WITH BIOPSIES;  Surgeon: Avery Almonte MD;  Location: [facility] ENDOSCOPY;  Service: Gastroenterology   • ENDOSCOPY N/A 2019    Procedure: ESOPHAGOGASTRODUODENOSCOPY WITH BIOPSIES;  Surgeon: Avery Almonte MD;  Location: [facility] ENDOSCOPY;  Service: Gastroenterology   • TONSILLECTOMY     • TUBAL ABDOMINAL LIGATION      Likely Fallope rings   • UPPER GASTROINTESTINAL ENDOSCOPY           Current Outpatient Medications:   •  nystatin-triamcinolone (MYCOLOG) 565120-7.1 UNIT/GM-% ointment, Apply 1 application topically to the appropriate area as directed 2 (Two) Times a Day for 7 days., Disp: 30 g, Rfl: 1  •  ondansetron (Zofran) 4 MG tablet, Take 1 tablet by mouth Every 8 (Eight) Hours As Needed for Nausea or Vomiting., Disp: 30 tablet, Rfl: 1    No Known Allergies    Social History     Tobacco Use   • Smoking status: Every Day     Packs/day: 0.50     Types: Cigarettes   • Smokeless tobacco: Never   Substance Use Topics   • Alcohol use: Yes     Comment: Occasional social use   • Drug use: Not Currently     Types: Marijuana       Family History   Problem Relation Age of Onset   • Uterine cancer Mother    • No Known Problems Brother    • No Known Problems Daughter    • No Known Problems Brother    • No Known Problems Daughter   "      Review of Systems   Constitutional: Positive for fatigue and unexpected weight loss. Negative for chills and fever.   Respiratory: Positive for shortness of breath. Negative for chest tightness.    Cardiovascular: Positive for leg swelling. Negative for chest pain and palpitations.   Gastrointestinal: Positive for abdominal distention, abdominal pain, diarrhea (not a change for the pt), nausea and vomiting. Negative for constipation.   Endocrine: Positive for heat intolerance. Negative for cold intolerance.   Genitourinary: Positive for amenorrhea, breast pain, menstrual problem, pelvic pain and vaginal pain. Negative for breast discharge, breast lump, dysuria, pelvic pressure, vaginal bleeding and vaginal discharge.        + vulvar irritation and blisters    Musculoskeletal: Positive for arthralgias (right ankle) and gait problem (walks with cane).   Skin: Positive for rash (vulvar).   Psychiatric/Behavioral: Positive for sleep disturbance.       /86   Ht 162.6 cm (64\")   Wt 107 kg (236 lb)   LMP  (LMP Unknown)   BMI 40.51 kg/m²     Physical Exam  Constitutional:       General: She is not in acute distress.     Appearance: Normal appearance. She is obese. She is ill-appearing. She is not toxic-appearing or diaphoretic.   Genitourinary:      Vulva, bladder and urethral meatus normal.      No lesions in the vagina.      Right Labia: rash, tenderness and skin changes.      Right Labia: No lesions or Bartholin's cyst.     Left Labia: tenderness, skin changes and rash.      Left Labia: No lesions or Bartholin's cyst.     No labial fusion noted.      Vulva exam comments: Bilateral labia beefy red and edematous, with no lesions, no drainage  .      No inguinal adenopathy present in the right or left side.     Vaginal erythema present.      No vaginal discharge, tenderness, bleeding or ulceration.      No vaginal prolapse present.     No vaginal atrophy present.       Right Adnexa: not tender, not full, not " palpable, no mass present and not absent.     Left Adnexa: not tender, not full, not palpable, no mass present and not absent.     No cervical motion tenderness, discharge, friability, lesion, polyp, nabothian cyst or eversion.      Uterus is tender.      Uterus is not enlarged, fixed, irregular or prolapsed.      No uterine mass detected.     No urethral tenderness or mass present.      Pelvic exam was performed with patient in the lithotomy position.   Breasts:     Breasts are symmetrical.      Right: Present. No swelling, bleeding, inverted nipple, mass, nipple discharge, skin change, tenderness or breast implant.      Left: Present. No swelling, bleeding, inverted nipple, mass, nipple discharge, skin change, tenderness or breast implant.   HENT:      Head: Normocephalic and atraumatic.   Eyes:      Pupils: Pupils are equal, round, and reactive to light.   Cardiovascular:      Rate and Rhythm: Tachycardia present.      Heart sounds: Normal heart sounds.   Pulmonary:      Effort: No respiratory distress.      Breath sounds: Normal breath sounds. No stridor. No wheezing, rhonchi or rales.      Comments: tachypnea   Chest:      Chest wall: No tenderness.   Abdominal:      General: There is no distension.      Palpations: Abdomen is soft. There is no mass.      Tenderness: There is no abdominal tenderness. There is no right CVA tenderness, left CVA tenderness, guarding or rebound.      Hernia: No hernia is present. There is no hernia in the left inguinal area or right inguinal area.   Musculoskeletal:         General: Normal range of motion.      Cervical back: Normal range of motion and neck supple. No tenderness.   Lymphadenopathy:      Cervical: No cervical adenopathy.      Upper Body:      Right upper body: No supraclavicular, axillary or pectoral adenopathy.      Left upper body: No supraclavicular, axillary or pectoral adenopathy.      Lower Body: No right inguinal adenopathy. No left inguinal adenopathy.    Neurological:      General: No focal deficit present.      Mental Status: She is alert and oriented to person, place, and time.      Cranial Nerves: No cranial nerve deficit.   Skin:     General: Skin is warm and dry.      Coloration: Skin is pale. Skin is not jaundiced.   Psychiatric:         Mood and Affect: Mood normal.         Behavior: Behavior normal.         Thought Content: Thought content normal.         Judgment: Judgment normal.   Vitals and nursing note reviewed.       Brief Urine Lab Results  (Last result in the past 365 days)      Color   Clarity   Blood   Leuk Est   Nitrite   Protein   CREAT   Urine HCG        11/09/22 1519     Negative   Small (1+)   Negative   30 mg/dL               Assessment   Diagnoses and all orders for this visit:    1. Women's annual routine gynecological examination (Primary)  -     IGP, Apt HPV,rfx 16 / 18,45    2. Pelvic pain  -     POC Pregnancy, Urine  -     POC Urinalysis Dipstick  -     NuSwab VG+ - Swab, Vagina  -     Urine Culture - Urine, Urine, Clean Catch  -     US Non-ob Transvaginal; Future    3. Nausea and vomiting, unspecified vomiting type  -     CBC & Differential  -     Comprehensive Metabolic Panel  -     ondansetron (Zofran) 4 MG tablet; Take 1 tablet by mouth Every 8 (Eight) Hours As Needed for Nausea or Vomiting.  Dispense: 30 tablet; Refill: 1    4. Amenorrhea  -     Follicle Stimulating Hormone  -     Hemoglobin A1c  -     TSH  -     US Non-ob Transvaginal; Future    5. Vaginal irritation  -     nystatin-triamcinolone (MYCOLOG) 051594-2.1 UNIT/GM-% ointment; Apply 1 application topically to the appropriate area as directed 2 (Two) Times a Day for 7 days.  Dispense: 30 g; Refill: 1    6. Encounter for medical examination to establish care  -     Ambulatory Referral to Family Practice    7. Breast pain  -     Mammo Diagnostic Digital Tomosynthesis Bilateral With CAD; Future         Plan   1. Well woman exam: Pap collected Yes. Recommend MVI daily.     2. Contraception: tubal ligation  3. STD: Desires STD screen today- No.   4. Smoking status: current every day smoker   5.  Encouraged annual mammogram screening starting at age 40. Instructed on how to perform SBE. Encouraged breast health self awareness.  6.    Encouraged 150 minutes of exercise per week if not medially contraindicated.   7.    Morbid obesity by BMI 40.51  8.    Pt is ill appearing. Advised she go to ED at this time. Verbalizes that she does not want to do this, discussed concerns with assessment today and recommend she go right away. She is considering calling her  to pick her up and take her to ED/ Recommend f/u with GI and PCP, discussed prolonged N&V not typically managed by GYN, however will send zofran to pharmacy today. Encouraged increased fluid. Checking labs today. Referral entered for new PCP  9.  For pelvic pain vaginal cultures were obtained, urine sent for culture, will complete TVUS at next visit in 1-2 weeks  10. For vaginal erythema, suspect candidiasis. Encouraged to keep skin clean and dry, cotton only underwear, avoid applying powder. Will send mycolog and reevaluate at f/u  11. Diagnostic mammogram ordered for c/o breast pain. Normal breast exam today  12. FSH & TVUS ordered for c/o amenorrhea    Follow Up one year or PRN    Holly Garnica, APRN  11/9/2022  18:31 EST

## 2022-11-10 ENCOUNTER — TELEPHONE (OUTPATIENT)
Dept: OBSTETRICS AND GYNECOLOGY | Facility: CLINIC | Age: 42
End: 2022-11-10

## 2022-11-10 LAB
ALBUMIN SERPL-MCNC: 3.2 G/DL (ref 3.8–4.8)
ALBUMIN/GLOB SERPL: 1 {RATIO} (ref 1.2–2.2)
ALP SERPL-CCNC: 177 IU/L (ref 44–121)
ALT SERPL-CCNC: 14 IU/L (ref 0–32)
AST SERPL-CCNC: 39 IU/L (ref 0–40)
BASOPHILS # BLD AUTO: 0.1 X10E3/UL (ref 0–0.2)
BASOPHILS NFR BLD AUTO: 0 %
BILIRUB SERPL-MCNC: 0.7 MG/DL (ref 0–1.2)
BUN SERPL-MCNC: 5 MG/DL (ref 6–24)
BUN/CREAT SERPL: 7 (ref 9–23)
CALCIUM SERPL-MCNC: 9.1 MG/DL (ref 8.7–10.2)
CHLORIDE SERPL-SCNC: 84 MMOL/L (ref 96–106)
CO2 SERPL-SCNC: 29 MMOL/L (ref 20–29)
CREAT SERPL-MCNC: 0.69 MG/DL (ref 0.57–1)
EGFRCR SERPLBLD CKD-EPI 2021: 111 ML/MIN/1.73
EOSINOPHIL # BLD AUTO: 0 X10E3/UL (ref 0–0.4)
EOSINOPHIL NFR BLD AUTO: 0 %
ERYTHROCYTE [DISTWIDTH] IN BLOOD BY AUTOMATED COUNT: 14.8 % (ref 11.7–15.4)
FSH SERPL-ACNC: 97.4 MIU/ML
GLOBULIN SER CALC-MCNC: 3.2 G/DL (ref 1.5–4.5)
GLUCOSE SERPL-MCNC: 169 MG/DL (ref 70–99)
HBA1C MFR BLD: 5.2 % (ref 4.8–5.6)
HCT VFR BLD AUTO: 39.7 % (ref 34–46.6)
HGB BLD-MCNC: 13.1 G/DL (ref 11.1–15.9)
IMM GRANULOCYTES # BLD AUTO: 0.1 X10E3/UL (ref 0–0.1)
IMM GRANULOCYTES NFR BLD AUTO: 1 %
LYMPHOCYTES # BLD AUTO: 2.8 X10E3/UL (ref 0.7–3.1)
LYMPHOCYTES NFR BLD AUTO: 19 %
MCH RBC QN AUTO: 31.2 PG (ref 26.6–33)
MCHC RBC AUTO-ENTMCNC: 33 G/DL (ref 31.5–35.7)
MCV RBC AUTO: 95 FL (ref 79–97)
MONOCYTES # BLD AUTO: 1.3 X10E3/UL (ref 0.1–0.9)
MONOCYTES NFR BLD AUTO: 9 %
NEUTROPHILS # BLD AUTO: 10.5 X10E3/UL (ref 1.4–7)
NEUTROPHILS NFR BLD AUTO: 71 %
PLATELET # BLD AUTO: 598 X10E3/UL (ref 150–450)
POTASSIUM SERPL-SCNC: 3.1 MMOL/L (ref 3.5–5.2)
PROT SERPL-MCNC: 6.4 G/DL (ref 6–8.5)
RBC # BLD AUTO: 4.2 X10E6/UL (ref 3.77–5.28)
SODIUM SERPL-SCNC: 139 MMOL/L (ref 134–144)
TSH SERPL DL<=0.005 MIU/L-ACNC: 6.75 UIU/ML (ref 0.45–4.5)
WBC # BLD AUTO: 14.7 X10E3/UL (ref 3.4–10.8)

## 2022-11-10 NOTE — PROGRESS NOTES
Leyla, can you please send labs to pt PCP office, Dr. Champagne? She needs f/u with them for ongoing N&V and abnormal thyroid, but I have not been able to reach her to review results. I left a VM to return my call. Thank you

## 2022-11-10 NOTE — TELEPHONE ENCOUNTER
I called Melissa Chanel to review lab results. No answer, left VM to return my call.     Holly Garnica, APRN  11/10/22  12:33pm

## 2022-11-10 NOTE — PROGRESS NOTES
I called the office at [phone] and got the fax number, [phone]. I have faxed the results to Dr Champagne's office and it successfully sent. Thank you.

## 2022-11-11 ENCOUNTER — TELEPHONE (OUTPATIENT)
Dept: OBSTETRICS AND GYNECOLOGY | Facility: CLINIC | Age: 42
End: 2022-11-11

## 2022-11-11 RX ORDER — CEPHALEXIN 500 MG/1
500 CAPSULE ORAL 4 TIMES DAILY
Qty: 40 CAPSULE | Refills: 0 | Status: SHIPPED | OUTPATIENT
Start: 2022-11-11 | End: 2022-11-21

## 2022-11-11 NOTE — TELEPHONE ENCOUNTER
I called Melissa Chanel to review lab results. No answer, left message to return my call. This is my second attempt to reach the pt.     There are several abnormal lab results that I would like to discuss with the pt. However if she calls back please let her know that her urine is positive for UTI. I have sent an antibiotic to her pharmacy to treat. I have forwarded lab results to her PCP.     Holly Garnica, APRN  11/11/22  10:33am

## 2022-11-12 LAB
BACTERIA UR CULT: ABNORMAL
OTHER ANTIBIOTIC SUSC ISLT: ABNORMAL

## 2022-11-15 ENCOUNTER — TELEPHONE (OUTPATIENT)
Dept: OBSTETRICS AND GYNECOLOGY | Facility: CLINIC | Age: 42
End: 2022-11-15

## 2022-11-15 LAB
CYTOLOGIST CVX/VAG CYTO: NORMAL
CYTOLOGY CVX/VAG DOC CYTO: NORMAL
CYTOLOGY CVX/VAG DOC THIN PREP: NORMAL
DX ICD CODE: NORMAL
HIV 1 & 2 AB SER-IMP: NORMAL
HPV I/H RISK 4 DNA CVX QL PROBE+SIG AMP: NEGATIVE
OTHER STN SPEC: NORMAL
STAT OF ADQ CVX/VAG CYTO-IMP: NORMAL

## 2022-11-15 NOTE — TELEPHONE ENCOUNTER
I  called  Chanel to review lab results. No answer, left message to return my call. This is my third attempt to reach the pt. Will send letter to pt.      There are several abnormal lab results that I would like to discuss with the pt. However if she calls back please let her know that her urine is positive for UTI. I have sent an antibiotic to her pharmacy to treat. I have forwarded lab results to her PCP.     Holly Garnica, APRN  11/15/22  8:46am

## 2022-11-15 NOTE — PROGRESS NOTES
Myra, I have tried to reach this pt 3 times with abnormal results with no returned phone call. Can you please mail certified letter that she needs to call for results as well as f/u with PCP regarding abnormal labs. Thank you

## 2022-11-18 ENCOUNTER — TELEPHONE (OUTPATIENT)
Dept: OBSTETRICS AND GYNECOLOGY | Facility: CLINIC | Age: 42
End: 2022-11-18

## 2022-11-18 RX ORDER — METRONIDAZOLE 65 MG/5G
1 GEL TOPICAL NIGHTLY
Qty: 5 G | Refills: 0 | Status: SHIPPED | OUTPATIENT
Start: 2022-11-18

## 2022-11-18 NOTE — TELEPHONE ENCOUNTER
I called Melissa Chanel to review lab results. These were reviewed in detail. Recommend f/u with PCP within the next week to address ongoing N&V and abnormal labs. TSH abnormal likely the source for amenorrhea. She is aware of UTI. Vaginal cultures positive BV. I will send metro gel to treat. She has f/u for u/s in one week, encouraged to keep this appointment.     Holly Garnica, APRN  11/18/22  10:13am

## 2022-11-23 ENCOUNTER — OFFICE VISIT (OUTPATIENT)
Dept: OBSTETRICS AND GYNECOLOGY | Facility: CLINIC | Age: 42
End: 2022-11-23

## 2022-11-23 VITALS — WEIGHT: 218 LBS | HEIGHT: 64 IN | BODY MASS INDEX: 37.22 KG/M2

## 2022-11-23 DIAGNOSIS — R53.83 OTHER FATIGUE: ICD-10-CM

## 2022-11-23 DIAGNOSIS — R10.2 PELVIC PAIN: ICD-10-CM

## 2022-11-23 DIAGNOSIS — R11.2 NAUSEA AND VOMITING, UNSPECIFIED VOMITING TYPE: ICD-10-CM

## 2022-11-23 DIAGNOSIS — R63.4 UNINTENTIONAL WEIGHT LOSS: ICD-10-CM

## 2022-11-23 DIAGNOSIS — N91.2 AMENORRHEA: Primary | ICD-10-CM

## 2022-11-23 PROCEDURE — 99214 OFFICE O/P EST MOD 30 MIN: CPT | Performed by: NURSE PRACTITIONER

## 2022-11-23 NOTE — PROGRESS NOTES
"Chief Complaint   Patient presents with   • Follow-up     Here for follow up to u/s.         SUBJECTIVE:     Melissa Chanel is a 42 y.o.  who presents to f/u amenorrhea and pelvic pain. TVUS completed today. She is here today with her . She did ambulate into the office today, but following her TVUS she is lethargic and weak. She has not followed up with PCP since her last visit 2022 as recommended. I did encourage her to go to ED after last visit, she went to urgent care instead. She reports several months of N&V, spouse says this has been ongoing for 6 months. She has had an 18 lb weight loss since 2022. Her  states she does not keep any solid food down, he has been giving her protein shakes to supplement. He also reports she has repeatedly refused to go to ED. Difficult to obtain BP with automated cuff. Manual BP 90/58    Past Medical History:   Diagnosis Date   • Abnormal Pap smear of cervix    • Depression    • Primary biliary cirrhosis (HCC)         Review of Systems   Constitutional: Positive for fatigue and unexpected weight change. Negative for fever.   Gastrointestinal: Positive for abdominal distention, abdominal pain, nausea and vomiting.   Neurological: Positive for dizziness, syncope, weakness and light-headedness.       OBJECTIVE:   Vitals:    22 1543   Weight: 98.9 kg (218 lb)   Height: 162.6 cm (64\")        Physical Exam  Constitutional:       Appearance: She is obese. She is ill-appearing and diaphoretic.   Cardiovascular:      Rate and Rhythm: Tachycardia present.   Pulmonary:      Breath sounds: Normal breath sounds.      Comments: + tachypnea   Neurological:      Mental Status: She is alert.   Vitals and nursing note reviewed.       WBC   Date Value Ref Range Status   2022 14.7 (H) 3.4 - 10.8 x10E3/uL Final     RBC   Date Value Ref Range Status   2022 4.20 3.77 - 5.28 x10E6/uL Final     Hemoglobin   Date Value Ref Range Status   2022 13.1 " 11.1 - 15.9 g/dL Final     Hematocrit   Date Value Ref Range Status   11/09/2022 39.7 34.0 - 46.6 % Final     MCV   Date Value Ref Range Status   11/09/2022 95 79 - 97 fL Final     MCH   Date Value Ref Range Status   11/09/2022 31.2 26.6 - 33.0 pg Final     MCHC   Date Value Ref Range Status   11/09/2022 33.0 31.5 - 35.7 g/dL Final     RDW   Date Value Ref Range Status   11/09/2022 14.8 11.7 - 15.4 % Final     Platelets   Date Value Ref Range Status   11/09/2022 598 (H) 150 - 450 x10E3/uL Final     Neutrophil Rel %   Date Value Ref Range Status   11/09/2022 71 Not Estab. % Final     Lymphocyte Rel %   Date Value Ref Range Status   11/09/2022 19 Not Estab. % Final     Monocyte Rel %   Date Value Ref Range Status   11/09/2022 9 Not Estab. % Final     Eosinophil Rel %   Date Value Ref Range Status   11/09/2022 0 Not Estab. % Final     Basophil Rel %   Date Value Ref Range Status   11/09/2022 0 Not Estab. % Final     Neutrophils Absolute   Date Value Ref Range Status   11/09/2022 10.5 (H) 1.4 - 7.0 x10E3/uL Final   09/29/2022 11.60 (H) 1.70 - 6.00 x10(3)/ul Final     Lymphocytes Absolute   Date Value Ref Range Status   11/09/2022 2.8 0.7 - 3.1 x10E3/uL Final     Monocytes Absolute   Date Value Ref Range Status   11/09/2022 1.3 (H) 0.1 - 0.9 x10E3/uL Final     Eosinophils Absolute   Date Value Ref Range Status   11/09/2022 0.0 0.0 - 0.4 x10E3/uL Final   09/29/2022 0.0 0.0 - 0.6 x10(3)/ul Final     Basophils Absolute   Date Value Ref Range Status   11/09/2022 0.1 0.0 - 0.2 x10E3/uL Final   09/29/2022 0.1 0.0 - 0.3 x10(3)/ul Final     Lab Results   Component Value Date    GLUCOSE 169 (H) 11/09/2022    BUN 5 (L) 11/09/2022    CREATININE 0.69 11/09/2022    EGFRIFNONA 112 11/25/2019    EGFRIFAFRI 129 11/25/2019    BCR 7 (L) 11/09/2022    K 3.1 (L) 11/09/2022    CO2 29 11/09/2022    CALCIUM 9.1 11/09/2022    PROTENTOTREF 6.4 11/09/2022    ALBUMIN 3.2 (L) 11/09/2022    LABIL2 1.0 (L) 11/09/2022    AST 39 11/09/2022    ALT 14  11/09/2022     TSH    TSH 11/9/22   TSH 6.750 (A)   (A) Abnormal value                Assessment/Plan    Diagnoses and all orders for this visit:    1. Amenorrhea (Primary)    2. Pelvic pain    3. Nausea and vomiting, unspecified vomiting type    4. Unintentional weight loss    5. Other fatigue    Upon entering the room the pt is sitting in a chair slumped over the exam table, she is pale and diaphoretic  Manual BP 90/58, , resp 28  18lb weight loss since 11/9/22  Discussed normal TVUS with pt  Reviewed amenorrhea likely is secondary to abnormal thyroid which needs to be managed by PCP  She is unable to undress for exam with me today, reports improvement in vulvar erythema since last visit  Reviewed with her  abnormal labs from last visit and my recommendations to f/u in ED now for management of prolonged N&V, weakness, weight loss, abnormal labs. Discussed my concerns for her condition deteriorating rapidly if not treated in a timely fashion. He states he will take her to ED now  The pt had to be taken to her car via a wheelchair where she repeatedly stated she was going to pass out, she was able to climb up into her 's truck to be transported to hospital    Follow up: 2 months, PRN, and annually    I spent 30 minutes caring for  on this date of service. This time includes time spent by me in the following activities: preparing for the visit, reviewing tests, obtaining and/or reviewing a separately obtained history, performing a medically appropriate examination and/or evaluation, counseling and educating the patient/family/caregiver, ordering medications, tests, or procedures, referring and communicating with other health care professionals and documenting information in the medical record    Holly Garnica, APRN  11/23/2022  16:25 EST

## 2022-11-24 ENCOUNTER — INPATIENT HOSPITAL (OUTPATIENT)
Dept: URBAN - METROPOLITAN AREA HOSPITAL 107 | Facility: HOSPITAL | Age: 42
End: 2022-11-24
Payer: MEDICAID

## 2022-11-24 DIAGNOSIS — R10.9 UNSPECIFIED ABDOMINAL PAIN: ICD-10-CM

## 2022-11-24 DIAGNOSIS — E87.6 HYPOKALEMIA: ICD-10-CM

## 2022-11-24 DIAGNOSIS — R11.2 NAUSEA WITH VOMITING, UNSPECIFIED: ICD-10-CM

## 2022-11-24 DIAGNOSIS — R63.4 ABNORMAL WEIGHT LOSS: ICD-10-CM

## 2022-11-24 DIAGNOSIS — R74.8 ABNORMAL LEVELS OF OTHER SERUM ENZYMES: ICD-10-CM

## 2022-11-24 PROCEDURE — 99223 1ST HOSP IP/OBS HIGH 75: CPT | Performed by: INTERNAL MEDICINE

## 2022-11-25 ENCOUNTER — INPATIENT HOSPITAL (OUTPATIENT)
Dept: URBAN - METROPOLITAN AREA HOSPITAL 107 | Facility: HOSPITAL | Age: 42
End: 2022-11-25
Payer: MEDICAID

## 2022-11-25 DIAGNOSIS — R63.4 ABNORMAL WEIGHT LOSS: ICD-10-CM

## 2022-11-25 DIAGNOSIS — R11.2 NAUSEA WITH VOMITING, UNSPECIFIED: ICD-10-CM

## 2022-11-25 DIAGNOSIS — R10.9 UNSPECIFIED ABDOMINAL PAIN: ICD-10-CM

## 2022-11-25 DIAGNOSIS — R51.9 HEADACHE, UNSPECIFIED: ICD-10-CM

## 2022-11-25 DIAGNOSIS — R19.7 DIARRHEA, UNSPECIFIED: ICD-10-CM

## 2022-11-25 DIAGNOSIS — E87.6 HYPOKALEMIA: ICD-10-CM

## 2022-11-25 PROCEDURE — 99232 SBSQ HOSP IP/OBS MODERATE 35: CPT | Performed by: INTERNAL MEDICINE

## 2022-11-26 PROCEDURE — 99231 SBSQ HOSP IP/OBS SF/LOW 25: CPT | Performed by: INTERNAL MEDICINE

## 2022-11-28 ENCOUNTER — INPATIENT HOSPITAL (OUTPATIENT)
Dept: URBAN - METROPOLITAN AREA HOSPITAL 107 | Facility: HOSPITAL | Age: 42
End: 2022-11-28
Payer: MEDICAID

## 2022-11-28 DIAGNOSIS — D50.9 IRON DEFICIENCY ANEMIA, UNSPECIFIED: ICD-10-CM

## 2022-11-28 DIAGNOSIS — R19.7 DIARRHEA, UNSPECIFIED: ICD-10-CM

## 2022-11-28 DIAGNOSIS — R11.2 NAUSEA WITH VOMITING, UNSPECIFIED: ICD-10-CM

## 2022-11-28 PROCEDURE — 99233 SBSQ HOSP IP/OBS HIGH 50: CPT | Performed by: PHYSICIAN ASSISTANT

## 2022-11-29 ENCOUNTER — INPATIENT HOSPITAL (OUTPATIENT)
Dept: URBAN - METROPOLITAN AREA HOSPITAL 107 | Facility: HOSPITAL | Age: 42
End: 2022-11-29
Payer: MEDICAID

## 2022-11-29 DIAGNOSIS — E87.6 HYPOKALEMIA: ICD-10-CM

## 2022-11-29 DIAGNOSIS — R63.4 ABNORMAL WEIGHT LOSS: ICD-10-CM

## 2022-11-29 DIAGNOSIS — R77.2 ABNORMALITY OF ALPHAFETOPROTEIN: ICD-10-CM

## 2022-11-29 DIAGNOSIS — R10.9 UNSPECIFIED ABDOMINAL PAIN: ICD-10-CM

## 2022-11-29 DIAGNOSIS — R11.2 NAUSEA WITH VOMITING, UNSPECIFIED: ICD-10-CM

## 2022-11-29 DIAGNOSIS — R19.7 DIARRHEA, UNSPECIFIED: ICD-10-CM

## 2022-11-29 DIAGNOSIS — D64.9 ANEMIA, UNSPECIFIED: ICD-10-CM

## 2022-11-29 PROCEDURE — 99233 SBSQ HOSP IP/OBS HIGH 50: CPT | Performed by: PHYSICIAN ASSISTANT

## 2022-11-30 ENCOUNTER — INPATIENT HOSPITAL (OUTPATIENT)
Dept: URBAN - METROPOLITAN AREA HOSPITAL 107 | Facility: HOSPITAL | Age: 42
End: 2022-11-30
Payer: MEDICAID

## 2022-11-30 DIAGNOSIS — K62.89 OTHER SPECIFIED DISEASES OF ANUS AND RECTUM: ICD-10-CM

## 2022-11-30 DIAGNOSIS — R63.4 ABNORMAL WEIGHT LOSS: ICD-10-CM

## 2022-11-30 DIAGNOSIS — R10.84 GENERALIZED ABDOMINAL PAIN: ICD-10-CM

## 2022-11-30 DIAGNOSIS — R19.7 DIARRHEA, UNSPECIFIED: ICD-10-CM

## 2022-11-30 DIAGNOSIS — R11.2 NAUSEA WITH VOMITING, UNSPECIFIED: ICD-10-CM

## 2022-11-30 DIAGNOSIS — K29.50 UNSPECIFIED CHRONIC GASTRITIS WITHOUT BLEEDING: ICD-10-CM

## 2022-11-30 PROCEDURE — 45380 COLONOSCOPY AND BIOPSY: CPT | Performed by: INTERNAL MEDICINE

## 2022-11-30 PROCEDURE — 43239 EGD BIOPSY SINGLE/MULTIPLE: CPT | Performed by: INTERNAL MEDICINE

## 2022-12-01 ENCOUNTER — INPATIENT HOSPITAL (OUTPATIENT)
Dept: URBAN - METROPOLITAN AREA HOSPITAL 107 | Facility: HOSPITAL | Age: 42
End: 2022-12-01
Payer: MEDICAID

## 2022-12-01 DIAGNOSIS — R94.5 ABNORMAL RESULTS OF LIVER FUNCTION STUDIES: ICD-10-CM

## 2022-12-01 PROCEDURE — 99233 SBSQ HOSP IP/OBS HIGH 50: CPT | Performed by: PHYSICIAN ASSISTANT

## 2022-12-02 ENCOUNTER — INPATIENT HOSPITAL (OUTPATIENT)
Dept: URBAN - METROPOLITAN AREA HOSPITAL 107 | Facility: HOSPITAL | Age: 42
End: 2022-12-02
Payer: MEDICAID

## 2022-12-02 DIAGNOSIS — R11.2 NAUSEA WITH VOMITING, UNSPECIFIED: ICD-10-CM

## 2022-12-02 DIAGNOSIS — R94.5 ABNORMAL RESULTS OF LIVER FUNCTION STUDIES: ICD-10-CM

## 2022-12-02 PROCEDURE — 99233 SBSQ HOSP IP/OBS HIGH 50: CPT | Performed by: PHYSICIAN ASSISTANT

## 2022-12-03 ENCOUNTER — INPATIENT HOSPITAL (OUTPATIENT)
Dept: URBAN - METROPOLITAN AREA HOSPITAL 107 | Facility: HOSPITAL | Age: 42
End: 2022-12-03
Payer: MEDICAID

## 2022-12-03 DIAGNOSIS — R76.8 OTHER SPECIFIED ABNORMAL IMMUNOLOGICAL FINDINGS IN SERUM: ICD-10-CM

## 2022-12-03 DIAGNOSIS — R10.9 UNSPECIFIED ABDOMINAL PAIN: ICD-10-CM

## 2022-12-03 DIAGNOSIS — R63.4 ABNORMAL WEIGHT LOSS: ICD-10-CM

## 2022-12-03 DIAGNOSIS — R74.8 ABNORMAL LEVELS OF OTHER SERUM ENZYMES: ICD-10-CM

## 2022-12-03 PROCEDURE — 99233 SBSQ HOSP IP/OBS HIGH 50: CPT | Performed by: INTERNAL MEDICINE

## 2022-12-04 ENCOUNTER — INPATIENT HOSPITAL (OUTPATIENT)
Dept: URBAN - METROPOLITAN AREA HOSPITAL 107 | Facility: HOSPITAL | Age: 42
End: 2022-12-04
Payer: MEDICAID

## 2022-12-04 DIAGNOSIS — R74.8 ABNORMAL LEVELS OF OTHER SERUM ENZYMES: ICD-10-CM

## 2022-12-04 DIAGNOSIS — R10.9 UNSPECIFIED ABDOMINAL PAIN: ICD-10-CM

## 2022-12-04 DIAGNOSIS — R63.4 ABNORMAL WEIGHT LOSS: ICD-10-CM

## 2022-12-04 PROCEDURE — 99233 SBSQ HOSP IP/OBS HIGH 50: CPT | Performed by: INTERNAL MEDICINE

## 2022-12-05 ENCOUNTER — INPATIENT HOSPITAL (OUTPATIENT)
Dept: URBAN - METROPOLITAN AREA HOSPITAL 107 | Facility: HOSPITAL | Age: 42
End: 2022-12-05
Payer: MEDICAID

## 2022-12-05 DIAGNOSIS — R11.2 NAUSEA WITH VOMITING, UNSPECIFIED: ICD-10-CM

## 2022-12-05 PROCEDURE — 99233 SBSQ HOSP IP/OBS HIGH 50: CPT | Performed by: PHYSICIAN ASSISTANT

## 2022-12-06 ENCOUNTER — INPATIENT HOSPITAL (OUTPATIENT)
Dept: URBAN - METROPOLITAN AREA HOSPITAL 107 | Facility: HOSPITAL | Age: 42
End: 2022-12-06
Payer: MEDICAID

## 2022-12-06 DIAGNOSIS — R11.2 NAUSEA WITH VOMITING, UNSPECIFIED: ICD-10-CM

## 2022-12-06 DIAGNOSIS — R94.5 ABNORMAL RESULTS OF LIVER FUNCTION STUDIES: ICD-10-CM

## 2022-12-06 PROCEDURE — 99233 SBSQ HOSP IP/OBS HIGH 50: CPT | Performed by: PHYSICIAN ASSISTANT

## 2022-12-07 ENCOUNTER — INPATIENT HOSPITAL (OUTPATIENT)
Dept: URBAN - METROPOLITAN AREA HOSPITAL 107 | Facility: HOSPITAL | Age: 42
End: 2022-12-07
Payer: MEDICAID

## 2022-12-07 DIAGNOSIS — R94.5 ABNORMAL RESULTS OF LIVER FUNCTION STUDIES: ICD-10-CM

## 2022-12-07 DIAGNOSIS — R10.84 GENERALIZED ABDOMINAL PAIN: ICD-10-CM

## 2022-12-07 DIAGNOSIS — R11.2 NAUSEA WITH VOMITING, UNSPECIFIED: ICD-10-CM

## 2022-12-07 PROCEDURE — 99233 SBSQ HOSP IP/OBS HIGH 50: CPT | Performed by: PHYSICIAN ASSISTANT

## 2022-12-09 ENCOUNTER — INPATIENT HOSPITAL (OUTPATIENT)
Dept: URBAN - METROPOLITAN AREA HOSPITAL 107 | Facility: HOSPITAL | Age: 42
End: 2022-12-09
Payer: MEDICAID

## 2022-12-09 DIAGNOSIS — R10.84 GENERALIZED ABDOMINAL PAIN: ICD-10-CM

## 2022-12-09 DIAGNOSIS — R11.2 NAUSEA WITH VOMITING, UNSPECIFIED: ICD-10-CM

## 2022-12-09 PROCEDURE — 99232 SBSQ HOSP IP/OBS MODERATE 35: CPT | Performed by: PHYSICIAN ASSISTANT

## 2022-12-11 ENCOUNTER — INPATIENT HOSPITAL (OUTPATIENT)
Dept: URBAN - METROPOLITAN AREA HOSPITAL 107 | Facility: HOSPITAL | Age: 42
End: 2022-12-11
Payer: MEDICAID

## 2022-12-11 DIAGNOSIS — A41.9 SEPSIS, UNSPECIFIED ORGANISM: ICD-10-CM

## 2022-12-11 PROCEDURE — 99232 SBSQ HOSP IP/OBS MODERATE 35: CPT | Performed by: INTERNAL MEDICINE

## 2022-12-12 ENCOUNTER — INPATIENT HOSPITAL (OUTPATIENT)
Dept: URBAN - METROPOLITAN AREA HOSPITAL 107 | Facility: HOSPITAL | Age: 42
End: 2022-12-12
Payer: MEDICAID

## 2022-12-12 DIAGNOSIS — E87.6 HYPOKALEMIA: ICD-10-CM

## 2022-12-12 DIAGNOSIS — R63.4 ABNORMAL WEIGHT LOSS: ICD-10-CM

## 2022-12-12 DIAGNOSIS — R77.2 ABNORMALITY OF ALPHAFETOPROTEIN: ICD-10-CM

## 2022-12-12 DIAGNOSIS — R11.2 NAUSEA WITH VOMITING, UNSPECIFIED: ICD-10-CM

## 2022-12-12 DIAGNOSIS — D64.9 ANEMIA, UNSPECIFIED: ICD-10-CM

## 2022-12-12 DIAGNOSIS — R10.9 UNSPECIFIED ABDOMINAL PAIN: ICD-10-CM

## 2022-12-12 DIAGNOSIS — R19.7 DIARRHEA, UNSPECIFIED: ICD-10-CM

## 2022-12-12 PROCEDURE — 99232 SBSQ HOSP IP/OBS MODERATE 35: CPT | Performed by: PHYSICIAN ASSISTANT

## 2023-02-10 DIAGNOSIS — N64.4 BREAST PAIN: Primary | ICD-10-CM

## 2023-02-15 ENCOUNTER — APPOINTMENT (OUTPATIENT)
Dept: CT IMAGING | Facility: HOSPITAL | Age: 43
End: 2023-02-15
Payer: MEDICAID

## 2023-02-15 ENCOUNTER — HOSPITAL ENCOUNTER (EMERGENCY)
Facility: HOSPITAL | Age: 43
Discharge: HOME | End: 2023-02-16
Attending: EMERGENCY MEDICINE | Admitting: EMERGENCY MEDICINE
Payer: MEDICAID

## 2023-02-15 DIAGNOSIS — R20.2 PARESTHESIAS: ICD-10-CM

## 2023-02-15 DIAGNOSIS — E66.01 MORBID OBESITY: ICD-10-CM

## 2023-02-15 DIAGNOSIS — R53.1 GENERALIZED WEAKNESS: ICD-10-CM

## 2023-02-15 DIAGNOSIS — R51.9 ACUTE NONINTRACTABLE HEADACHE, UNSPECIFIED HEADACHE TYPE: Primary | ICD-10-CM

## 2023-02-15 LAB
ALBUMIN SERPL-MCNC: 3 G/DL (ref 3.5–5.2)
ALBUMIN/GLOB SERPL: 1.3 G/DL
ALP SERPL-CCNC: 126 U/L (ref 39–117)
ALT SERPL W P-5'-P-CCNC: 28 U/L (ref 1–33)
ANION GAP SERPL CALCULATED.3IONS-SCNC: 11 MMOL/L (ref 5–15)
AST SERPL-CCNC: 77 U/L (ref 1–32)
BASOPHILS # BLD AUTO: 0.07 10*3/MM3 (ref 0–0.2)
BASOPHILS NFR BLD AUTO: 0.9 % (ref 0–1.5)
BILIRUB SERPL-MCNC: 0.3 MG/DL (ref 0–1.2)
BUN SERPL-MCNC: 5 MG/DL (ref 6–20)
BUN/CREAT SERPL: 8.1 (ref 7–25)
CALCIUM SPEC-SCNC: 8.7 MG/DL (ref 8.6–10.5)
CHLORIDE SERPL-SCNC: 101 MMOL/L (ref 98–107)
CO2 SERPL-SCNC: 26 MMOL/L (ref 22–29)
CREAT SERPL-MCNC: 0.62 MG/DL (ref 0.57–1)
DEPRECATED RDW RBC AUTO: 54.7 FL (ref 37–54)
EGFRCR SERPLBLD CKD-EPI 2021: 114.2 ML/MIN/1.73
EOSINOPHIL # BLD AUTO: 0.11 10*3/MM3 (ref 0–0.4)
EOSINOPHIL NFR BLD AUTO: 1.4 % (ref 0.3–6.2)
ERYTHROCYTE [DISTWIDTH] IN BLOOD BY AUTOMATED COUNT: 15.9 % (ref 12.3–15.4)
GLOBULIN UR ELPH-MCNC: 2.4 GM/DL
GLUCOSE SERPL-MCNC: 92 MG/DL (ref 65–99)
HCT VFR BLD AUTO: 35.1 % (ref 34–46.6)
HGB BLD-MCNC: 11.1 G/DL (ref 12–15.9)
IMM GRANULOCYTES # BLD AUTO: 0.02 10*3/MM3 (ref 0–0.05)
IMM GRANULOCYTES NFR BLD AUTO: 0.3 % (ref 0–0.5)
LYMPHOCYTES # BLD AUTO: 3.26 10*3/MM3 (ref 0.7–3.1)
LYMPHOCYTES NFR BLD AUTO: 41.3 % (ref 19.6–45.3)
MCH RBC QN AUTO: 29.8 PG (ref 26.6–33)
MCHC RBC AUTO-ENTMCNC: 31.6 G/DL (ref 31.5–35.7)
MCV RBC AUTO: 94.1 FL (ref 79–97)
MONOCYTES # BLD AUTO: 0.57 10*3/MM3 (ref 0.1–0.9)
MONOCYTES NFR BLD AUTO: 7.2 % (ref 5–12)
NEUTROPHILS NFR BLD AUTO: 3.87 10*3/MM3 (ref 1.7–7)
NEUTROPHILS NFR BLD AUTO: 48.9 % (ref 42.7–76)
NRBC BLD AUTO-RTO: 0 /100 WBC (ref 0–0.2)
PLATELET # BLD AUTO: 440 10*3/MM3 (ref 140–450)
PMV BLD AUTO: 10 FL (ref 6–12)
POTASSIUM SERPL-SCNC: 3.5 MMOL/L (ref 3.5–5.2)
PROT SERPL-MCNC: 5.4 G/DL (ref 6–8.5)
RBC # BLD AUTO: 3.73 10*6/MM3 (ref 3.77–5.28)
SODIUM SERPL-SCNC: 138 MMOL/L (ref 136–145)
WBC NRBC COR # BLD: 7.9 10*3/MM3 (ref 3.4–10.8)

## 2023-02-15 PROCEDURE — 25010000002 DIPHENHYDRAMINE PER 50 MG: Performed by: EMERGENCY MEDICINE

## 2023-02-15 PROCEDURE — 25010000002 DROPERIDOL PER 5 MG: Performed by: EMERGENCY MEDICINE

## 2023-02-15 PROCEDURE — 70450 CT HEAD/BRAIN W/O DYE: CPT

## 2023-02-15 PROCEDURE — 25010000002 KETOROLAC TROMETHAMINE PER 15 MG: Performed by: EMERGENCY MEDICINE

## 2023-02-15 PROCEDURE — 85025 COMPLETE CBC W/AUTO DIFF WBC: CPT | Performed by: EMERGENCY MEDICINE

## 2023-02-15 PROCEDURE — 99283 EMERGENCY DEPT VISIT LOW MDM: CPT

## 2023-02-15 PROCEDURE — 96374 THER/PROPH/DIAG INJ IV PUSH: CPT

## 2023-02-15 PROCEDURE — 96375 TX/PRO/DX INJ NEW DRUG ADDON: CPT

## 2023-02-15 PROCEDURE — 80053 COMPREHEN METABOLIC PANEL: CPT | Performed by: EMERGENCY MEDICINE

## 2023-02-15 RX ORDER — KETOROLAC TROMETHAMINE 15 MG/ML
15 INJECTION, SOLUTION INTRAMUSCULAR; INTRAVENOUS ONCE
Status: COMPLETED | OUTPATIENT
Start: 2023-02-15 | End: 2023-02-15

## 2023-02-15 RX ORDER — DIPHENHYDRAMINE HYDROCHLORIDE 50 MG/ML
50 INJECTION INTRAMUSCULAR; INTRAVENOUS ONCE
Status: COMPLETED | OUTPATIENT
Start: 2023-02-15 | End: 2023-02-15

## 2023-02-15 RX ORDER — DROPERIDOL 2.5 MG/ML
2.5 INJECTION, SOLUTION INTRAMUSCULAR; INTRAVENOUS ONCE
Status: COMPLETED | OUTPATIENT
Start: 2023-02-15 | End: 2023-02-15

## 2023-02-15 RX ADMIN — DROPERIDOL 2.5 MG: 2.5 INJECTION, SOLUTION INTRAMUSCULAR; INTRAVENOUS at 22:19

## 2023-02-15 RX ADMIN — KETOROLAC TROMETHAMINE 15 MG: 15 INJECTION, SOLUTION INTRAMUSCULAR; INTRAVENOUS at 22:19

## 2023-02-15 RX ADMIN — DIPHENHYDRAMINE HYDROCHLORIDE 50 MG: 50 INJECTION, SOLUTION INTRAMUSCULAR; INTRAVENOUS at 22:20

## 2023-02-16 VITALS
DIASTOLIC BLOOD PRESSURE: 122 MMHG | SYSTOLIC BLOOD PRESSURE: 153 MMHG | TEMPERATURE: 98 F | HEART RATE: 102 BPM | OXYGEN SATURATION: 99 % | RESPIRATION RATE: 18 BRPM

## 2023-02-16 NOTE — ED PROVIDER NOTES
EMERGENCY DEPARTMENT ENCOUNTER    Room Number:  23/23  Date seen:  2/15/2023  PCP: Nasrin Champagne MD  Historian: Patient, EMS who brought patient to ED      HPI:  Chief Complaint: Headache  A complete HPI/ROS/PMH/PSH/SH/FH are unobtainable due to:   Context: Melissa Chanel is a 42 y.o. female who presents to the ED c/o headache.  Patient states she had onset of headache this morning.  Headache is frontal and throbbing.  Headache is fairly constant and worsened by nothing, improved by nothing.  It is rated moderate to severe.  Patient was seen today by physical therapist who stated that her blood pressure was high and that she should come in to get checked out.  Patient states she has had ongoing weakness and tingling all over her body ongoing for a number of months.  She states that she has been bedbound and nonambulatory.  She is unsure of her medications or what is causing her symptoms.  She does not have a great comprehension of her recent medical work-up.  Review of old records shows that she has been admitted with urosepsis and multifocal pneumonia at the end of last year and was hospitalized at [facility] last month with fluid overload, electrolyte disturbance and urinary tract infection.  Chronic medical problems include history of alcohol abuse.      MEDICAL RECORD REVIEW (non ED)  I reviewed prior medical records note patient was hospitalized about 1 month ago at [facility] for acute metabolic encephalopathy.  Patient found to have pulmonary edema and fluid overload.  She was treated with IV diuresis.    PAST MEDICAL HISTORY  Active Ambulatory Problems     Diagnosis Date Noted   • Melena 04/13/2017   • Tobacco use 04/13/2017   • Alcohol abuse 04/13/2017   • Mixed incontinence 05/18/2017   • Subserous leiomyoma of uterus 05/18/2017   • Lower abdominal pain 07/27/2017   • Dysmenorrhea 08/22/2017   • Right ovarian cyst 08/22/2017   • Morbid obesity (HCC) 02/14/2019   • Menorrhagia  2019   • Encounter for tobacco use cessation counseling 2019   • Chronic gastritis without bleeding 2019   • Abdominal wall cellulitis 2019   • Bacterial vaginosis 2019   • Periumbilical abdominal pain 2019   • Diarrhea 2019     Resolved Ambulatory Problems     Diagnosis Date Noted   • LLQ abdominal pain 2017   • Left ovarian cyst 2017   • Screening for cervical cancer 2017   • Elevated liver enzymes 2017   • Enlarged uterus 2017   • Menometrorrhagia 2017   • Oligomenorrhea 2017     Past Medical History:   Diagnosis Date   • Abnormal Pap smear of cervix    • Depression    • Primary biliary cirrhosis (HCC)          PAST SURGICAL HISTORY  Past Surgical History:   Procedure Laterality Date   • ANKLE SURGERY Right    •  SECTION     • CHOLECYSTECTOMY     • COLONOSCOPY W/ POLYPECTOMY N/A 2019    Procedure: COLONOSCOPY WITH BIOPSIES;  Surgeon: Avery Almonte MD;  Location: [facility] ENDOSCOPY;  Service: Gastroenterology   • ENDOSCOPY N/A 2019    Procedure: ESOPHAGOGASTRODUODENOSCOPY WITH BIOPSIES;  Surgeon: Avery Almonte MD;  Location: [facility] ENDOSCOPY;  Service: Gastroenterology   • TONSILLECTOMY     • TUBAL ABDOMINAL LIGATION      Likely Fallope rings   • UPPER GASTROINTESTINAL ENDOSCOPY  2016         FAMILY HISTORY  Family History   Problem Relation Age of Onset   • Uterine cancer Mother    • No Known Problems Brother    • No Known Problems Daughter    • No Known Problems Brother    • No Known Problems Daughter          SOCIAL HISTORY  Social History     Socioeconomic History   • Marital status:    Tobacco Use   • Smoking status: Every Day     Packs/day: 0.50     Types: Cigarettes   • Smokeless tobacco: Never   Substance and Sexual Activity   • Alcohol use: Yes     Comment: Occasional social use   • Drug use: Not Currently     Types: Marijuana   • Sexual activity: Yes     Partners: Male     Birth  control/protection: Surgical, Tubal ligation         ALLERGIES  Patient has no known allergies.        REVIEW OF SYSTEMS  Review of Systems   Constitutional: Positive for fatigue. Negative for fever.   Respiratory: Negative for shortness of breath.    Cardiovascular: Negative for chest pain.   Gastrointestinal: Positive for nausea.   Neurological: Positive for weakness and headaches.        Generalized tingling in hands and feet which has been present for  multiple months.   All other systems reviewed and are negative.           PHYSICAL EXAM  ED Triage Vitals [02/15/23 1841]   Temp Heart Rate Resp BP SpO2   98.3 °F (36.8 °C) 110 16 150/90 98 %      Temp src Heart Rate Source Patient Position BP Location FiO2 (%)   Oral Monitor -- -- --       Physical Exam    GENERAL: Alert female in no obvious distress, seems somewhat anxious.  Triage vitals are notable for initial blood pressure of 150/90.  Afebrile with pulse of 110.  O2 sats are benign.  HENT: nares patent  EYES: no scleral icterus  CV: regular rhythm, regular rate-mildly tachycardic without murmur  RESPIRATORY: normal effort, clear to auscultation bilaterally-O2 sats upper 90s on room air  ABDOMEN: soft, morbidly obese without tenderness to palpation  MUSCULOSKELETAL: no deformity  NEURO: Strength-moderate generalized weakness.  Sensation-decreased light touch to upper and lower extremities.  Coordination is grossly intact.  Speech and mentation are unremarkable  SKIN: warm, dry      Vital signs and nursing notes reviewed.          LAB RESULTS  Recent Results (from the past 24 hour(s))   Comprehensive Metabolic Panel    Collection Time: 02/15/23 10:18 PM    Specimen: Blood   Result Value Ref Range    Glucose 92 65 - 99 mg/dL    BUN 5 (L) 6 - 20 mg/dL    Creatinine 0.62 0.57 - 1.00 mg/dL    Sodium 138 136 - 145 mmol/L    Potassium 3.5 3.5 - 5.2 mmol/L    Chloride 101 98 - 107 mmol/L    CO2 26.0 22.0 - 29.0 mmol/L    Calcium 8.7 8.6 - 10.5 mg/dL    Total Protein  5.4 (L) 6.0 - 8.5 g/dL    Albumin 3.0 (L) 3.5 - 5.2 g/dL    ALT (SGPT) 28 1 - 33 U/L    AST (SGOT) 77 (H) 1 - 32 U/L    Alkaline Phosphatase 126 (H) 39 - 117 U/L    Total Bilirubin 0.3 0.0 - 1.2 mg/dL    Globulin 2.4 gm/dL    A/G Ratio 1.3 g/dL    BUN/Creatinine Ratio 8.1 7.0 - 25.0    Anion Gap 11.0 5.0 - 15.0 mmol/L    eGFR 114.2 >60.0 mL/min/1.73   CBC Auto Differential    Collection Time: 02/15/23 10:18 PM    Specimen: Blood   Result Value Ref Range    WBC 7.90 3.40 - 10.80 10*3/mm3    RBC 3.73 (L) 3.77 - 5.28 10*6/mm3    Hemoglobin 11.1 (L) 12.0 - 15.9 g/dL    Hematocrit 35.1 34.0 - 46.6 %    MCV 94.1 79.0 - 97.0 fL    MCH 29.8 26.6 - 33.0 pg    MCHC 31.6 31.5 - 35.7 g/dL    RDW 15.9 (H) 12.3 - 15.4 %    RDW-SD 54.7 (H) 37.0 - 54.0 fl    MPV 10.0 6.0 - 12.0 fL    Platelets 440 140 - 450 10*3/mm3    Neutrophil % 48.9 42.7 - 76.0 %    Lymphocyte % 41.3 19.6 - 45.3 %    Monocyte % 7.2 5.0 - 12.0 %    Eosinophil % 1.4 0.3 - 6.2 %    Basophil % 0.9 0.0 - 1.5 %    Immature Grans % 0.3 0.0 - 0.5 %    Neutrophils, Absolute 3.87 1.70 - 7.00 10*3/mm3    Lymphocytes, Absolute 3.26 (H) 0.70 - 3.10 10*3/mm3    Monocytes, Absolute 0.57 0.10 - 0.90 10*3/mm3    Eosinophils, Absolute 0.11 0.00 - 0.40 10*3/mm3    Basophils, Absolute 0.07 0.00 - 0.20 10*3/mm3    Immature Grans, Absolute 0.02 0.00 - 0.05 10*3/mm3    nRBC 0.0 0.0 - 0.2 /100 WBC       Ordered the above labs and independently interpreted results. My findings will be discussed in the medical decision making section below        RADIOLOGY  CT Head Without Contrast    Result Date: 2/15/2023  Patient: JUDD LONG  Time Out: 21:58 Exam(s): CT HEAD Without Contrast EXAM:   CT Head Without Intravenous Contrast CLINICAL HISTORY:    Reason for exam: Headache, nausea. TECHNIQUE:   Axial computed tomography images of the head brain without intravenous contrast.  CTDI is 55.4 mGy and DLP is 926 mGy-cm.  This CT exam was performed according to the principle of GRAEME (As Low  As Reasonably Achievable) by using one or more of the following dose reduction techniques: automated exposure control, adjustment of the mA and or kV according to patient size, and or use of iterative reconstruction technique. COMPARISON:   No relevant prior studies available. FINDINGS:   Brain:  Unremarkable.  No hemorrhage.  No significant white matter disease.  No edema.   Ventricles:  Unremarkable.  No ventriculomegaly.   Bones joints:  Unremarkable.  No acute fracture.   Soft tissues:  Unremarkable.   Sinuses:  Unremarkable as visualized.  No acute sinusitis.   Mastoid air cells:  Unremarkable as visualized.  No mastoid effusion. IMPRESSION:       Normal head brain CT.     Electronically signed by Bebeto Garcia MD on 02-15-23 at 2158      I ordered and independently reviewed the above noted radiographic studies.                PROCEDURES  Procedures          MEDICATIONS GIVEN IN ER  Medications   droperidol (INAPSINE) injection 2.5 mg (2.5 mg Intravenous Given 2/15/23 2219)   ketorolac (TORADOL) injection 15 mg (15 mg Intravenous Given 2/15/23 2219)   diphenhydrAMINE (BENADRYL) injection 50 mg (50 mg Intravenous Given 2/15/23 2220)               MEDICAL DECISION MAKING, PROGRESS, and CONSULTS    All labs have been independently reviewed by me.  All radiology studies have been reviewed by me and I have also reviewed the radiology report.   EKG's independently viewed and interpreted by me.  Discussion below represents my analysis of pertinent findings related to patient's condition, differential diagnosis, treatment plan and final disposition.      Additional sources:  - Discussed/ obtained information from independent historians:      - External (non-ED) record review: Please see documented above    - Chronic or social conditions impacting care: Chronic weakness, history of alcohol abuse    - Shared decision making: I discussed ED evaluation and treatment plan with patient who is in agreement.  I discussed at  "length with patient about the results of ED testing.  Head CT and labs are benign.    While she does have generalized weakness, this is fairly chronic and has been present for months.  I see no clear indication for admission or further ED work-up at this time.  Would encourage patient to follow-up with primary care provider as outpatient.      Orders placed during this visit:  Orders Placed This Encounter   Procedures   • CT Head Without Contrast   • Comprehensive Metabolic Panel   • CBC Auto Differential   • CBC & Differential           Differential diagnosis:    Please see as documented below in ED course      Independent interpretation of labs, radiology studies, and discussions with consultants:  ED Course as of 02/15/23 2316   Wed Feb 15, 2023   2105 MDM-complicated 42-year-old female with history of alcohol abuse and multiple admissions presents with headache and reported high blood pressure.  Patient has had ongoing history of weakness and tingling.  She has been bedbound for several months and is nonambulatory.    On exam initial blood pressure 150/90 and afebrile.  Patient is well-appearing.  She does have some diffuse generalized weakness without focal deficit.    Differential diagnosis is quite broad.  Would consider nonspecific headache, would consider intracranial problem such as tumor or hemorrhage or trauma.  Infection or electrolyte disturbance are also considered.    We will go ahead and give a \"migraine cocktail\".  We will get labs and CT scan of the head for further assessment. [DB]   2200 CT scan of the brain discussed with the reading radiologist shows no active disease. [DB]   2310 Labs are reviewed and are pretty unremarkable.  Hepatic and renal testing shows no worrisome abnormalities.  There is mild elevation of AST and alkaline phosphatase.  There is slightly low protein stores.    At this point I do not see worrisome causes of headache.  Vitals and physical exam are benign.  Suspect this " "is nonspecific headache.    Patient has had ongoing weakness and has been bedbound for quite some time.  This is not acute and do not believe that it requires emergent work-up at this time. [DB]   8666 Headache is markedly improved after \"migraine cocktail.  I discussed with patient that lab testing and CT were benign.  No real indication for admission at this time.  Encouraged her to follow-up with primary care provider and/or neurologist for further evaluation of her weakness and paresthesias. [DB]      ED Course User Index  [DB] James Engel MD             I used full protective equipment while examining this patient.  This includes face mask, gloves and protective eyewear.  I washed my hands before entering the room and immediately upon leaving the room    DIAGNOSIS  Final diagnoses:   Acute nonintractable headache, unspecified headache type   Generalized weakness   Paresthesias   Morbid obesity (HCC)         DISPOSITION  DISCHARGE    Patient discharged in stable condition.    Reviewed implications of results, diagnosis, meds, responsibility to follow up, warning signs and symptoms of possible worsening, potential complications and reasons to return to ER, including worsening symptoms, fever or as needed.    Patient/Family voiced understanding of above instructions.    Discussed plan for discharge, as there is no emergent indication for admission. Patient referred to primary care provider for BP management due to today's BP. Pt/family is agreeable and understands need for follow up and repeat testing.  Pt is aware that discharge does not mean that nothing is wrong but it indicates no emergency is present that requires admission and they must continue care with follow-up as given below or physician of their choice.     FOLLOW-UP  Nasrin Champagne MD  [address]  [place] [id number] [phone]    In 1 week  If Not Better         Medication List      No changes were made to your prescriptions " during this visit.                   Latest Documented Vital Signs:  As of 23:16 EST  BP- 150/90 HR- 110 Temp- 98.3 °F (36.8 °C) (Oral) O2 sat- 98%              --    Please note that portions of this were completed with a voice recognition program.       Note Disclaimer: At [facility], we believe that sharing information builds trust and better relationships. You are receiving this note because you are receiving care at [facility] or recently visited. It is possible you will see health information before a provider has talked with you about it. This kind of information can be easy to misunderstand. To help you fully understand what it means for your health, we urge you to discuss this note with your provider.           James Engel MD  02/15/23 3230

## 2023-02-16 NOTE — CASE MANAGEMENT/SOCIAL WORK
Continued Stay Note  [facility]     Patient Name: Melissa Chanel  MRN: [id number]  Today's Date: 2/16/2023    Admit Date: 2/15/2023        Discharge Plan     Row Name 02/16/23 0007       Plan    Plan Comments [facility] informed of need to transport pt home. ETA ~ 0300. Verified pts address               Discharge Codes    No documentation.                     Denice Benavides RN

## 2023-02-17 ENCOUNTER — TELEPHONE (OUTPATIENT)
Dept: OBSTETRICS AND GYNECOLOGY | Facility: CLINIC | Age: 43
End: 2023-02-17
Payer: MEDICAID

## 2023-02-17 NOTE — TELEPHONE ENCOUNTER
Pt is aware of appt at [facility] 3/17/23 @ 930 & 10am  For a DX Bilat Mammo & Bilat Comp US.  SR

## (undated) DEVICE — TUBING, SUCTION, 1/4" X 10', STRAIGHT: Brand: MEDLINE

## (undated) DEVICE — CANN NASL CO2 TRULINK W/O2 A/

## (undated) DEVICE — SINGLE-USE BIOPSY FORCEPS: Brand: RADIAL JAW 4

## (undated) DEVICE — KT VLV BIOGUARD SXN BIOP AIR/H20 CONN 4PC DISP

## (undated) DEVICE — SENSR O2 OXIMAX FNGR A/ 18IN NONSTR

## (undated) DEVICE — BITEBLOCK OMNI BLOC

## (undated) DEVICE — THE TORRENT IRRIGATION SCOPE CONNECTOR IS USED WITH THE TORRENT IRRIGATION TUBING TO PROVIDE IRRIGATION FLUIDS SUCH AS STERILE WATER DURING GASTROINTESTINAL ENDOSCOPIC PROCEDURES WHEN USED IN CONJUNCTION WITH AN IRRIGATION PUMP (OR ELECTROSURGICAL UNIT).: Brand: TORRENT